# Patient Record
Sex: MALE | Employment: FULL TIME | URBAN - NONMETROPOLITAN AREA
[De-identification: names, ages, dates, MRNs, and addresses within clinical notes are randomized per-mention and may not be internally consistent; named-entity substitution may affect disease eponyms.]

---

## 2019-04-09 ENCOUNTER — HOSPITAL ENCOUNTER (OUTPATIENT)
Dept: GENERAL RADIOLOGY | Facility: HOSPITAL | Age: 39
Discharge: HOME | End: 2019-04-09
Attending: PHYSICIAN ASSISTANT

## 2019-04-09 LAB
25(OH)D3 SERPL-MCNC: 51.7 NG/ML (ref 30–100)
ALBUMIN SERPL-MCNC: 4.7 G/DL (ref 3.5–5)
ALBUMIN/GLOB SERPL: 1.6 {RATIO} (ref 1.4–2.6)
ALP SERPL-CCNC: 87 U/L (ref 53–128)
ALT SERPL-CCNC: 75 U/L (ref 10–40)
ANION GAP SERPL CALC-SCNC: 18 MMOL/L (ref 8–19)
AST SERPL-CCNC: 43 U/L (ref 15–50)
BASOPHILS # BLD AUTO: 0.08 10*3/UL (ref 0–0.2)
BASOPHILS NFR BLD AUTO: 1.1 % (ref 0–3)
BILIRUB SERPL-MCNC: 0.44 MG/DL (ref 0.2–1.3)
BUN SERPL-MCNC: 15 MG/DL (ref 5–25)
BUN/CREAT SERPL: 15 {RATIO} (ref 6–20)
CALCIUM SERPL-MCNC: 10 MG/DL (ref 8.7–10.4)
CHLORIDE SERPL-SCNC: 99 MMOL/L (ref 99–111)
CHOLEST SERPL-MCNC: 382 MG/DL (ref 107–200)
CHOLEST/HDLC SERPL: 10.3 {RATIO} (ref 3–6)
CONV ABS IMM GRAN: 0.08 10*3/UL (ref 0–0.2)
CONV CO2: 24 MMOL/L (ref 22–32)
CONV IMMATURE GRAN: 1.1 % (ref 0–1.8)
CONV TOTAL PROTEIN: 7.6 G/DL (ref 6.3–8.2)
CREAT UR-MCNC: 0.98 MG/DL (ref 0.7–1.2)
DEPRECATED RDW RBC AUTO: 45.5 FL (ref 35.1–43.9)
EOSINOPHIL # BLD AUTO: 0.21 10*3/UL (ref 0–0.7)
EOSINOPHIL # BLD AUTO: 2.8 % (ref 0–7)
ERYTHROCYTE [DISTWIDTH] IN BLOOD BY AUTOMATED COUNT: 14.3 % (ref 11.6–14.4)
GFR SERPLBLD BASED ON 1.73 SQ M-ARVRAT: >60 ML/MIN/{1.73_M2}
GLOBULIN UR ELPH-MCNC: 2.9 G/DL (ref 2–3.5)
GLUCOSE SERPL-MCNC: 102 MG/DL (ref 70–99)
HBA1C MFR BLD: 15.5 G/DL (ref 14–18)
HCT VFR BLD AUTO: 48.2 % (ref 42–52)
HDLC SERPL-MCNC: 37 MG/DL (ref 40–60)
LDLC SERPL CALC-MCNC: 272 MG/DL (ref 70–100)
LYMPHOCYTES # BLD AUTO: 1.96 10*3/UL (ref 1–5)
MCH RBC QN AUTO: 28.2 PG (ref 27–31)
MCHC RBC AUTO-ENTMCNC: 32.2 G/DL (ref 33–37)
MCV RBC AUTO: 87.8 FL (ref 80–96)
MONOCYTES # BLD AUTO: 0.57 10*3/UL (ref 0.2–1.2)
MONOCYTES NFR BLD AUTO: 7.6 % (ref 3–10)
NEUTROPHILS # BLD AUTO: 4.61 10*3/UL (ref 2–8)
NEUTROPHILS NFR BLD AUTO: 61.3 % (ref 30–85)
NRBC CBCN: 0 % (ref 0–0.7)
OSMOLALITY SERPL CALC.SUM OF ELEC: 283 MOSM/KG (ref 273–304)
PLATELET # BLD AUTO: 251 10*3/UL (ref 130–400)
PMV BLD AUTO: 11.5 FL (ref 9.4–12.4)
POTASSIUM SERPL-SCNC: 4.7 MMOL/L (ref 3.5–5.3)
RBC # BLD AUTO: 5.49 10*6/UL (ref 4.7–6.1)
SODIUM SERPL-SCNC: 136 MMOL/L (ref 135–147)
T4 FREE SERPL-MCNC: 1.2 NG/DL (ref 0.9–1.8)
TRIGL SERPL-MCNC: 647 MG/DL (ref 40–150)
TSH SERPL-ACNC: 2.26 M[IU]/L (ref 0.27–4.2)
VARIANT LYMPHS NFR BLD MANUAL: 26.1 % (ref 20–45)
WBC # BLD AUTO: 7.51 10*3/UL (ref 4.8–10.8)

## 2019-10-04 ENCOUNTER — HOSPITAL ENCOUNTER (OUTPATIENT)
Dept: GENERAL RADIOLOGY | Facility: HOSPITAL | Age: 39
Discharge: HOME | End: 2019-10-04
Attending: FAMILY MEDICINE

## 2019-10-04 ENCOUNTER — OFFICE VISIT CONVERTED (OUTPATIENT)
Dept: FAMILY MEDICINE CLINIC | Facility: CLINIC | Age: 39
End: 2019-10-04
Attending: FAMILY MEDICINE

## 2019-10-04 LAB
ALBUMIN SERPL-MCNC: 5.3 G/DL (ref 3.5–5)
ALBUMIN/GLOB SERPL: 1.8 {RATIO} (ref 1.4–2.6)
ALP SERPL-CCNC: 96 U/L (ref 53–128)
ALT SERPL-CCNC: 68 U/L (ref 10–40)
ANION GAP SERPL CALC-SCNC: 21 MMOL/L (ref 8–19)
AST SERPL-CCNC: 39 U/L (ref 15–50)
BILIRUB SERPL-MCNC: 0.45 MG/DL (ref 0.2–1.3)
BUN SERPL-MCNC: 14 MG/DL (ref 5–25)
BUN/CREAT SERPL: 14 {RATIO} (ref 6–20)
CALCIUM SERPL-MCNC: 10.7 MG/DL (ref 8.7–10.4)
CHLORIDE SERPL-SCNC: 100 MMOL/L (ref 99–111)
CONV CO2: 21 MMOL/L (ref 22–32)
CONV TOTAL PROTEIN: 8.2 G/DL (ref 6.3–8.2)
CREAT UR-MCNC: 0.98 MG/DL (ref 0.7–1.2)
GFR SERPLBLD BASED ON 1.73 SQ M-ARVRAT: >60 ML/MIN/{1.73_M2}
GLOBULIN UR ELPH-MCNC: 2.9 G/DL (ref 2–3.5)
GLUCOSE SERPL-MCNC: 106 MG/DL (ref 70–99)
OSMOLALITY SERPL CALC.SUM OF ELEC: 287 MOSM/KG (ref 273–304)
POTASSIUM SERPL-SCNC: 4.2 MMOL/L (ref 3.5–5.3)
SODIUM SERPL-SCNC: 138 MMOL/L (ref 135–147)

## 2020-01-09 ENCOUNTER — OFFICE VISIT CONVERTED (OUTPATIENT)
Dept: FAMILY MEDICINE CLINIC | Facility: CLINIC | Age: 40
End: 2020-01-09
Attending: FAMILY MEDICINE

## 2020-01-09 ENCOUNTER — CONVERSION ENCOUNTER (OUTPATIENT)
Dept: FAMILY MEDICINE CLINIC | Facility: CLINIC | Age: 40
End: 2020-01-09

## 2020-07-10 ENCOUNTER — OFFICE VISIT CONVERTED (OUTPATIENT)
Dept: FAMILY MEDICINE CLINIC | Facility: CLINIC | Age: 40
End: 2020-07-10
Attending: FAMILY MEDICINE

## 2020-07-17 ENCOUNTER — HOSPITAL ENCOUNTER (OUTPATIENT)
Dept: GENERAL RADIOLOGY | Facility: HOSPITAL | Age: 40
Discharge: HOME | End: 2020-07-17
Attending: FAMILY MEDICINE

## 2020-07-17 LAB
ALBUMIN SERPL-MCNC: 4.9 G/DL (ref 3.5–5)
ALBUMIN/GLOB SERPL: 1.9 {RATIO} (ref 1.4–2.6)
ALP SERPL-CCNC: 72 U/L (ref 53–128)
ALT SERPL-CCNC: 67 U/L (ref 10–40)
ANION GAP SERPL CALC-SCNC: 24 MMOL/L (ref 8–19)
AST SERPL-CCNC: 47 U/L (ref 15–50)
BASOPHILS # BLD AUTO: 0.11 10*3/UL (ref 0–0.2)
BASOPHILS NFR BLD AUTO: 1.2 % (ref 0–3)
BILIRUB SERPL-MCNC: 0.39 MG/DL (ref 0.2–1.3)
BUN SERPL-MCNC: 11 MG/DL (ref 5–25)
BUN/CREAT SERPL: 12 {RATIO} (ref 6–20)
CALCIUM SERPL-MCNC: 10.1 MG/DL (ref 8.7–10.4)
CHLORIDE SERPL-SCNC: 100 MMOL/L (ref 99–111)
CHOLEST SERPL-MCNC: 288 MG/DL (ref 107–200)
CHOLEST/HDLC SERPL: 6.5 {RATIO} (ref 3–6)
CONV ABS IMM GRAN: 0.15 10*3/UL (ref 0–0.2)
CONV CO2: 19 MMOL/L (ref 22–32)
CONV IMMATURE GRAN: 1.7 % (ref 0–1.8)
CONV TOTAL PROTEIN: 7.5 G/DL (ref 6.3–8.2)
CREAT UR-MCNC: 0.95 MG/DL (ref 0.7–1.2)
DEPRECATED RDW RBC AUTO: 45.4 FL (ref 35.1–43.9)
EOSINOPHIL # BLD AUTO: 0.32 10*3/UL (ref 0–0.7)
EOSINOPHIL # BLD AUTO: 3.5 % (ref 0–7)
ERYTHROCYTE [DISTWIDTH] IN BLOOD BY AUTOMATED COUNT: 14.1 % (ref 11.6–14.4)
GFR SERPLBLD BASED ON 1.73 SQ M-ARVRAT: >60 ML/MIN/{1.73_M2}
GLOBULIN UR ELPH-MCNC: 2.6 G/DL (ref 2–3.5)
GLUCOSE SERPL-MCNC: 96 MG/DL (ref 70–99)
HCT VFR BLD AUTO: 48.9 % (ref 42–52)
HDLC SERPL-MCNC: 44 MG/DL (ref 40–60)
HGB BLD-MCNC: 15.7 G/DL (ref 14–18)
LDLC SERPL CALC-MCNC: 151 MG/DL (ref 70–100)
LYMPHOCYTES # BLD AUTO: 2.43 10*3/UL (ref 1–5)
LYMPHOCYTES NFR BLD AUTO: 26.8 % (ref 20–45)
MCH RBC QN AUTO: 28.4 PG (ref 27–31)
MCHC RBC AUTO-ENTMCNC: 32.1 G/DL (ref 33–37)
MCV RBC AUTO: 88.6 FL (ref 80–96)
MONOCYTES # BLD AUTO: 0.61 10*3/UL (ref 0.2–1.2)
MONOCYTES NFR BLD AUTO: 6.7 % (ref 3–10)
NEUTROPHILS # BLD AUTO: 5.46 10*3/UL (ref 2–8)
NEUTROPHILS NFR BLD AUTO: 60.1 % (ref 30–85)
NRBC CBCN: 0 % (ref 0–0.7)
OSMOLALITY SERPL CALC.SUM OF ELEC: 285 MOSM/KG (ref 273–304)
PLATELET # BLD AUTO: 250 10*3/UL (ref 130–400)
PMV BLD AUTO: 10.8 FL (ref 9.4–12.4)
POTASSIUM SERPL-SCNC: 4.6 MMOL/L (ref 3.5–5.3)
PSA SERPL-MCNC: 0.43 NG/ML (ref 0–4)
RBC # BLD AUTO: 5.52 10*6/UL (ref 4.7–6.1)
SODIUM SERPL-SCNC: 138 MMOL/L (ref 135–147)
TRIGL SERPL-MCNC: 531 MG/DL (ref 40–150)
TSH SERPL-ACNC: 2.4 M[IU]/L (ref 0.27–4.2)
WBC # BLD AUTO: 9.08 10*3/UL (ref 4.8–10.8)

## 2020-09-02 ENCOUNTER — CONVERSION ENCOUNTER (OUTPATIENT)
Dept: FAMILY MEDICINE CLINIC | Facility: CLINIC | Age: 40
End: 2020-09-02

## 2020-09-02 ENCOUNTER — OFFICE VISIT CONVERTED (OUTPATIENT)
Dept: FAMILY MEDICINE CLINIC | Facility: CLINIC | Age: 40
End: 2020-09-02
Attending: FAMILY MEDICINE

## 2020-10-02 ENCOUNTER — OFFICE VISIT CONVERTED (OUTPATIENT)
Dept: FAMILY MEDICINE CLINIC | Facility: CLINIC | Age: 40
End: 2020-10-02
Attending: FAMILY MEDICINE

## 2021-05-13 NOTE — PROGRESS NOTES
Progress Note      Patient Name: Reji Montana   Patient ID: [id number]   Sex: Male   YOB: 1980    Primary Care Provider: Sanam Johnson DO   Referring Provider: Sanam Johnson DO    Visit Date: September 2, 2020    Provider: Sanam Johnson DO   Location: [facility]   Location Address: [address] [place] [address]  [address] [place] [address]  [id number]   Location Phone: [phone]          Chief Complaint  · follow up from blood work  · blood pressure running high      History Of Present Illness  Reji Montana is a 40 year old /White male who presents for evaluation and treatment of:      He is here today for follow-up for the management of his chronic medical conditions.  He has a past medical history significant for anxiety disorder, hypertension, GERD and hyperlipidemia.    Labs 7/17/2020: , HDL 44, triglyceride 531, PSA 0.43, creatinine 0.95, glucose greater than 60, potassium 4.6, ALT 67, hemoglobin 15.7, MCV 88.6, platelets 250, TSH 2.40.    Labs 10/4/19: Creatinine 0.98, GFR greater than 60, potassium 4.2, ALT 68, AST 39.    Labs/9/19: Hemoglobin 15.5, MCV 87.8, platelets 251.    4/9/2019: Triglycerides 647, , HDL 37, vitamin D 51.7, TSH 2.26, free T4 1.2.    He said his blood pressure at home when he checks it is around high 130s/90s. He has seen numbers at 150/95.    As long as he takes his 20 mg of omeprazole his reflux is well controlled.    He says the Lexapro still helps him with his anxiety.     He says that his b/l hand pain is much better with Magnesium daily.  He says it is worse at the end of the day.  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  He says at nighttime his hands ache and sometimes wake him up when he is trying to sleep. Has been taking Magnesium qday and using the Volatren about every other day and has helped wit the hand pain. He has started taking magnesium and his hand pain is better.     He has no other  complaints today. denies chest pain, shortness of breath, weakness, numbness, nausea, vomiting, diarrhea, dizziness or syncopal event.                Past Medical History  Disease Name Date Onset Notes   Anxiety --  --    Depression --  --    Hypertension --  --          Past Surgical History  Procedure Name Date Notes   EGD 2015 --    Hernia-Inguinal --  --    Tonsillectomy 2008 --          Medication List  Name Date Started Instructions   atorvastatin 40 mg oral tablet 09/02/2020 take 1 po qhs   escitalopram oxalate 10 mg oral tablet 08/31/2020 TAKE 1 TABLET(10 MG) BY MOUTH EVERY DAY   lisinopril 40 mg oral tablet 09/02/2020 take 1 qd   omeprazole 20 mg oral capsule,delayed release(DR/EC)  take 1 capsule (20 mg) by oral route once daily before a meal   Voltaren 1 % topical gel 07/10/2020 apply 2 grams to the affected area(s) by topical route 4 times per day for 30 days         Allergy List  Allergen Name Date Reaction Notes   NO KNOWN DRUG ALLERGIES --  --  --          Family Medical History  Disease Name Relative/Age Notes   Lung Neoplasm, Malignant Grandfather (maternal)/  Grandmother (maternal)/   --    Hypertension Grandfather (maternal)/  Grandmother (maternal)/   --    - No Family History of Colorectal Cancer  --          Social History  Finding Status Start/Stop Quantity Notes   Alcohol Current some day --/-- --  3-4 beers every other day   Tobacco Current every day 23/-- 1ppd  --          Immunizations  NameDate Admin Mfg Trade Name Lot Number Route Inj VIS Given VIS Publication   InfluenzaRefused 01/09/2020 NE Not Entered  NE NE     Comments: patient declined         Review of Systems  · Constitutional  o * See HPI  · HENT  o * See HPI  · Cardiovascular  o * See HPI  · Respiratory  o * See HPI  · Gastrointestinal  o * See HPI  · Musculoskeletal  o * See HPI  · Psychiatric  o * See HPI      Vitals  Date Time BP Position Site L\R Cuff Size HR RR TEMP (F) WT  HT  BMI kg/m2 BSA m2 O2 Sat HC       09/02/2020  04:01 /99 Sitting    72 - R 20 98.8 200lbs 3oz 6'   27.15 2.15 98 %          Physical Examination  · Constitutional  o Appearance  o : well-nourished, well developed, alert, no obvious deformities present, NAD  · Head and Face  o Head  o :   § Inspection  § : atraumatic, normocephalic  · Ears, Nose, Mouth and Throat  o Ears  o :   § External Ears  § : normal  o Nose  o :   § Intranasal Exam  § : nares patent  o Oral Cavity  o :   § Oral Mucosa  § : moist mucous membranes  · Respiratory  o Respiratory Effort  o : breathing comfortably, symmetric chest rise  o Auscultation of Lungs  o : clear to asculatation bilaterally, no wheezes, rales, or rhonchii  · Cardiovascular  o Heart  o :   § Auscultation of Heart  § : regular rate and rhythm, no murmurs, rubs, or gallops  o Peripheral Vascular System  o :   § Extremities  § : no edema  · Skin and Subcutaneous Tissue  o General Inspection  o : no rashes present, no lesions present, no areas of discoloration, skin turgor normal, texture normal  · Neurologic  o Mental Status Examination  o :   § Orientation  § : grossly oriented to person, place and time  o Cranial Nerves  o : cranial nerves intact bilaterally  o Gait and Station  o :   § Gait Screening  § : normal gait  · Psychiatric  o General  o : normal mood and affect              Assessment  · Anxiety disorder     300.00/F41.9  · Essential hypertension     401.9/I10  · GERD (gastroesophageal reflux disease)     530.81/K21.9  · Hyperlipidemia     272.4/E78.5  · Encounter for medication monitoring     V58.83/Z51.81      Plan  · Orders  o ACO-39: Current medications updated and reviewed () - - 09/02/2020  o ACO-14: Influenza immunization was not administered for reasons documented () - - 09/02/2020  · Medications  o atorvastatin 40 mg oral tablet   SIG: take 1 po qhs   DISP: (90) tablets with 1 refills  Adjusted on 09/02/2020     o lisinopril 40 mg oral tablet   SIG: take 1 qd   DISP: (90) tablets with 1  refills  Adjusted on 09/02/2020     o Medications have been Reconciled  o Transition of Care or Provider Policy  · Instructions  o Advised that cheeses and other sources of dairy fats, animal fats, fast food, and the extras (candy, pastries, pies, doughnuts and cookies) all contain LDL raising nutrients. Advised to increase fruits, vegetables, whole grains, and to monitor portion sizes.   o Patient was educated/instructed on their diagnosis, treatment and medications prior to discharge from the clinic today.  · Disposition  o Return Visit Request in/on 4 months +/- 2 days ([id number]).     1.  Hypertension: The patient's lisinopril was increased at today's visit from 20 to 40 mg daily.  He was encouraged to continue his blood pressure log to bring back to his next appointment.  2.  Hyperlipidemia: The patient's atorvastatin was increased from 10 mg to 40 mg daily.  We will repeat a lipid profile and 4 to 6 months.  3.  GERD: The patient's GERD is currently well controlled with his current dosage of omeprazole daily.  4.  Anxiety disorder: The patient's anxiety is currently well controlled with Lexapro 10 mg daily.  Follow-up in 4 months.             Electronically Signed by: Sanam Johnson DO -Author on September 3, 2020 09:46:41 PM

## 2021-05-13 NOTE — PROGRESS NOTES
Progress Note      Patient Name: Reji Montana   Patient ID: [id number]   Sex: Male   YOB: 1980    Primary Care Provider: Sanam Johnson DO   Referring Provider: Sanam Johnson DO    Visit Date: July 10, 2020    Provider: Sanam Johnson DO   Location: [facility]   Location Address: [address] [place] [address] [address]  [address] [place] [address]  [id number]   Location Phone: [phone]          Chief Complaint  · Pt states her today for follow up and having problems with his hands      History Of Present Illness  Reji Montana is a 40 year old /White male who presents for evaluation and treatment of:      He is here today for follow-up for the management of his chronic medical conditions.  He has a past medical history significant for anxiety disorder, hypertension, GERD and hyperlipidemia.    Labs 10/4/19: Creatinine 0.98, GFR greater than 60, potassium 4.2, ALT 68, AST 39.    Labs/9/19: Hemoglobin 15.5, MCV 87.8, platelets 251.    4/9/2019: Triglycerides 647, , HDL 37, vitamin D 51.7, TSH 2.26, free T4 1.2.    He said his blood pressure at home when he checks it is around 128/88.    He is tolerating his 10 mg atorvastatin without muscle cramps.    As long as he takes his 20 mg of omeprazole his reflux is well controlled.    He says the Lexapro still helps him with his anxiety.    He is having b/l hand pain that is getting worse.  He says it is worse at the end of the day.  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  He says at nighttime his hands ache and sometimes wake him up when he is trying to sleep.    He has no other complaints today denies chest pain, shortness of breath, weakness, numbness, nausea, vomiting, diarrhea, dizziness or syncopal event.       Past Medical History  Disease Name Date Onset Notes   Anxiety --  --    Depression --  --    Hypertension --  --          Past Surgical History  Procedure Name Date Notes   EGD 2015 --    Hernia-Inguinal --   --    Tonsillectomy 2008 --          Medication List  Name Date Started Instructions   atorvastatin 10 mg oral tablet 04/08/2020 TAKE 1 TABLET BY MOUTH AT BEDTIME   escitalopram oxalate 10 mg oral tablet 06/04/2020 TAKE 1 TABLET(10 MG) BY MOUTH EVERY DAY   lisinopril 20 mg oral tablet 04/08/2020 TAKE 1 TABLET BY MOUTH ONCE DAILY   omeprazole 20 mg oral capsule,delayed release(DR/EC)  take 1 capsule (20 mg) by oral route once daily before a meal         Allergy List  Allergen Name Date Reaction Notes   NO KNOWN DRUG ALLERGIES --  --  --          Family Medical History  Disease Name Relative/Age Notes   Lung Neoplasm, Malignant Grandfather (maternal)/  Grandmother (maternal)/   --    Hypertension Grandfather (maternal)/  Grandmother (maternal)/   --    - No Family History of Colorectal Cancer  --          Social History  Finding Status Start/Stop Quantity Notes   Alcohol Current some day --/-- --  3-4 beers every other day   Tobacco Current every day 23/-- 1ppd  --          Immunizations  NameDate Admin Mfg Trade Name Lot Number Route Inj VIS Given VIS Publication   InfluenzaRefused 01/09/2020 NE Not Entered  NE NE     Comments: patient declined         Review of Systems  · Constitutional  o * See HPI  · HENT  o * See HPI  · Cardiovascular  o * See HPI  · Respiratory  o * See HPI  · Gastrointestinal  o * See HPI  · Musculoskeletal  o * See HPI      Vitals  Date Time BP Position Site L\R Cuff Size HR RR TEMP (F) WT  HT  BMI kg/m2 BSA m2 O2 Sat HC       07/10/2020 03:56 /102 Sitting    84 - R 16 98.2 204lbs 6oz 6'   27.72 2.17 96 %    07/10/2020 03:56 /97 Sitting                     Physical Examination  · Constitutional  o Appearance  o : well-nourished, well developed, alert, NAD  · Head and Face  o Head  o :   § Inspection  § : atraumatic, normocephalic  · Eyes  o Eyes  o : extraocular movements intact, no scleral icterus, no conjunctival injection  · Ears, Nose, Mouth and Throat  o Ears  o :   § External  Ears  § : normal  o Nose  o :   § Intranasal Exam  § : nares patent  o Oral Cavity  o :   § Oral Mucosa  § : moist mucous membranes  · Respiratory  o Respiratory Effort  o : breathing comfortably, symmetric chest rise  o Auscultation of Lungs  o : clear to asculatation bilaterally, no wheezes, rales, or rhonchii  · Cardiovascular  o Heart  o :   § Auscultation of Heart  § : regular rate and rhythm, no murmurs, rubs, or gallops  o Peripheral Vascular System  o :   § Extremities  § : no edema  · Skin and Subcutaneous Tissue  o General Inspection  o : no rashes present, no lesions present, no areas of discoloration, skin turgor normal  · Neurologic  o Mental Status Examination  o :   § Orientation  § : grossly oriented to person, place and time  o Cranial Nerves  o : crainial nerves 2-12 grossly intact  o Gait and Station  o :   § Gait Screening  § : normal gait  · Psychiatric  o General  o : normal mood and affect          Assessment  · Anxiety disorder     300.00/F41.9  · Essential hypertension     401.9/I10  · GERD (gastroesophageal reflux disease)     530.81/K21.9  · Hyperlipidemia     272.4/E78.5  · Screening for prostate cancer     V76.44/Z12.5  · Hand pain     729.5/M79.643    Problems Reconciled  Plan  · Orders  o Male Physical Primary Care Panel (CMP, CBC, TSH, Lipid, PSA) [facility] ([id number], 75848, 53220, 90366, 12480, ) - 272.4/E78.5, V76.44/Z12.5, 401.9/I10 - 07/10/2020  o ACO-39: Current medications updated and reviewed () - - 07/10/2020  · Medications  o Voltaren 1 % topical gel   SIG: apply 2 grams to the affected area(s) by topical route 4 times per day for 30 days   DISP: (1) 100 gm tube with 2 refills  Prescribed on 07/10/2020     o Medications have been Reconciled  o Transition of Care or Provider Policy  · Instructions  o Advised that cheeses and other sources of dairy fats, animal fats, fast food, and the extras (candy, pastries, pies, doughnuts and cookies) all contain LDL raising nutrients.  Advised to increase fruits, vegetables, whole grains, and to monitor portion sizes.   o Patient was educated/instructed on their diagnosis, treatment and medications prior to discharge from the clinic today.  · Disposition  o Follow Up in 2 weeks     1.  Anxiety disorder: The patient's anxiety appears to be well controlled with 10 mg of Lexapro daily.  2.  Hypertension: The patient's blood pressure is elevated today's visit.  His home pressures appear to be closer to normal but are still high as well.  At his next clinic appointment if his blood pressure still elevated we will increase his antihypertensive medications.  3.  GERD: Patient's GERD is currently well controlled with his current dose of omeprazole.  4.  Hyperlipidemia: The patient will be continued on atorvastatin 10 mg daily.  He was given a lab order today for a lipid profile to be managed according findings.  5.  Hand pain: Patient was given a prescription today for Voltaren cream.  He is also told to use over-the-counter magnesium cases to muscle cramps.  Follow-up in 2 weeks.             Electronically Signed by: Sanam Johnson DO - on July 10, 2020 07:07:47 PM

## 2021-05-13 NOTE — PROGRESS NOTES
Progress Note      Patient Name: Reji Montana   Patient ID: [id number]   Sex: Male   YOB: 1980    Primary Care Provider: Sanam Johnson DO   Referring Provider: Sanam Johnson DO    Visit Date: October 2, 2020    Provider: Sanam Johnson DO   Location: [facility]   Location Address: [address]  [place], [place]  [id number]          Chief Complaint  · 1mo follow up       History Of Present Illness  Reji Montana is a 40 year old /White male who presents for evaluation and treatment of:      He is here today for follow-up for the management of his chronic medical conditions.  He has a past medical history significant for anxiety disorder, hypertension, GERD and hyperlipidemia.    Labs 7/17/2020: , HDL 44, triglyceride 531, PSA 0.43, creatinine 0.95, glucose greater than 60, potassium 4.6, ALT 67, hemoglobin 15.7, MCV 88.6, platelets 250, TSH 2.40.    Labs 10/4/19: Creatinine 0.98, GFR greater than 60, potassium 4.2, ALT 68, AST 39.    Labs/9/19: Hemoglobin 15.5, MCV 87.8, platelets 251.    4/9/2019: Triglycerides 647, , HDL 37, vitamin D 51.7, TSH 2.26, free T4 1.2.    He said his blood pressure at home when he checks it on average is 128/89. He says he feels much better now that his blood pressure is lower.     His GERD is well controlled with 20 mg of omeprazole daily.    He says the Lexapro still helps him with his anxiety.     His hand pain at night is still much better with magnesium and voltarian cream.     He has no other complaints today. denies chest pain, shortness of breath, weakness, numbness, nausea, vomiting, diarrhea, dizziness or syncopal event.              Past Medical History  Disease Name Date Onset Notes   Anxiety --  --    Depression --  --    Hypertension --  --          Past Surgical History  Procedure Name Date Notes   EGD 2015 --    Hernia-Inguinal --  --    Tonsillectomy 2008 --          Medication List  Name Date Started  Instructions   atorvastatin 40 mg oral tablet 09/02/2020 take 1 po qhs   escitalopram oxalate 10 mg oral tablet 08/31/2020 TAKE 1 TABLET(10 MG) BY MOUTH EVERY DAY   lisinopril 40 mg oral tablet 09/02/2020 take 1 qd   omeprazole 20 mg oral capsule,delayed release(DR/EC)  take 1 capsule (20 mg) by oral route once daily before a meal   Voltaren 1 % topical gel 07/10/2020 apply 2 grams to the affected area(s) by topical route 4 times per day for 30 days         Allergy List  Allergen Name Date Reaction Notes   NO KNOWN DRUG ALLERGIES --  --  --          Family Medical History  Disease Name Relative/Age Notes   Lung Neoplasm, Malignant Grandfather (maternal)/  Grandmother (maternal)/   --    Hypertension Grandfather (maternal)/  Grandmother (maternal)/   --    - No Family History of Colorectal Cancer  --          Social History  Finding Status Start/Stop Quantity Notes   Alcohol Current some day --/-- --  3-4 beers every other day   Tobacco Current every day 23/-- 1ppd  --          Immunizations  NameDate Admin Mfg Trade Name Lot Number Route Inj VIS Given VIS Publication   InfluenzaRefused 01/09/2020 NE Not Entered  NE NE     Comments: patient declined         Review of Systems  · Constitutional  o * See HPI  · HENT  o * See HPI  · Cardiovascular  o * See HPI  · Respiratory  o * See HPI  · Gastrointestinal  o * See HPI  · Integument  o * See HPI  · Neurologic  o * See HPI  · Musculoskeletal  o * See HPI  · Psychiatric  o * See HPI      Vitals  Date Time BP Position Site L\R Cuff Size HR RR TEMP (F) WT  HT  BMI kg/m2 BSA m2 O2 Sat FR L/min FiO2 HC       10/02/2020 03:42 /99 Sitting    70 - R 18 98.3 202lbs 8oz 6'   27.46 2.16 96 %  21%          Physical Examination  · Constitutional  o Appearance  o : no acute distress, well-nourished, no obvious deformities  · Head and Face  o Head  o :   § Inspection  § : atraumatic, normocephalic  · Ears, Nose, Mouth and Throat  o Ears  o :   § External Ears  § :  normal  o Nose  o :   § Intranasal Exam  § : nares patent  o Oral Cavity  o :   § Oral Mucosa  § : moist mucous membranes  · Respiratory  o Respiratory Effort  o : breathing comfortably, symmetric chest rise  o Auscultation of Lungs  o : clear to asculatation bilaterally, no wheezes, rales, or rhonchii  · Cardiovascular  o Heart  o :   § Auscultation of Heart  § : regular rate and rhythm, no murmurs, rubs, or gallops  o Peripheral Vascular System  o :   § Extremities  § : no edema  · Neurologic  o Mental Status Examination  o :   § Orientation  § : grossly oriented to person, place and time  o Cranial Nerves  o : cranial nerves intact bilaterally  o Gait and Station  o :   § Gait Screening  § : normal gait  · Psychiatric  o General  o : normal mood and affect              Assessment  · Essential hypertension     401.9/I10  His BP is much improved, continue Lisinopril 40 mg qday  · GERD (gastroesophageal reflux disease)     530.81/K21.9  Well controlled with omeprazole  · Hyperlipidemia     272.4/E78.5  He is tolerating the atorvastatin 40mg well. He is also taking fish oil. He is gradually making better food choices. He does not formally exercise but he is active with is work.  · Encounter for medication monitoring     V58.83/Z51.81  He was given a lab order for a CMP to make sure his liver enzymes are still normal with the increase in atorvastatin.       Plan  · Orders  o CMP [facility] (80615) - 272.4/E78.5, V58.83/Z51.81 - 10/02/2020  o ACO-39: Current medications updated and reviewed (1159F, ) - - 10/02/2020  o ACO-14: Influenza immunization was not administered for reasons documented [facility] () - - 10/02/2020  o Magnesium, serum (04447) - - 10/02/2020  · Medications  o Medications have been Reconciled  o Transition of Care or Provider Policy  · Instructions  o Advised that cheeses and other sources of dairy fats, animal fats, fast food, and the extras (candy, pastries, pies, doughnuts and cookies) all contain  LDL raising nutrients. Advised to increase fruits, vegetables, whole grains, and to monitor portion sizes.   o Patient was educated/instructed on their diagnosis, treatment and medications prior to discharge from the clinic today.  · Disposition  o Follow Up in 3 months            Electronically Signed by: Sanam Johnson DO -Author on October 6, 2020 09:25:19 PM

## 2021-05-14 VITALS
SYSTOLIC BLOOD PRESSURE: 148 MMHG | OXYGEN SATURATION: 98 % | HEART RATE: 72 BPM | BODY MASS INDEX: 27.11 KG/M2 | TEMPERATURE: 98.8 F | RESPIRATION RATE: 20 BRPM | DIASTOLIC BLOOD PRESSURE: 99 MMHG | WEIGHT: 200.19 LBS | HEIGHT: 72 IN

## 2021-05-14 VITALS
BODY MASS INDEX: 27.43 KG/M2 | TEMPERATURE: 98.3 F | HEIGHT: 72 IN | SYSTOLIC BLOOD PRESSURE: 132 MMHG | WEIGHT: 202.5 LBS | OXYGEN SATURATION: 96 % | RESPIRATION RATE: 18 BRPM | HEART RATE: 70 BPM | DIASTOLIC BLOOD PRESSURE: 99 MMHG

## 2021-05-15 VITALS
HEART RATE: 90 BPM | SYSTOLIC BLOOD PRESSURE: 141 MMHG | DIASTOLIC BLOOD PRESSURE: 68 MMHG | HEIGHT: 72 IN | OXYGEN SATURATION: 99 % | TEMPERATURE: 97.8 F | BODY MASS INDEX: 27.69 KG/M2 | WEIGHT: 204.44 LBS | RESPIRATION RATE: 20 BRPM

## 2021-05-15 VITALS
SYSTOLIC BLOOD PRESSURE: 134 MMHG | HEART RATE: 94 BPM | WEIGHT: 202.25 LBS | HEIGHT: 72 IN | OXYGEN SATURATION: 99 % | TEMPERATURE: 98.2 F | DIASTOLIC BLOOD PRESSURE: 87 MMHG | RESPIRATION RATE: 18 BRPM | BODY MASS INDEX: 27.39 KG/M2

## 2021-05-15 VITALS
DIASTOLIC BLOOD PRESSURE: 102 MMHG | OXYGEN SATURATION: 96 % | HEART RATE: 84 BPM | BODY MASS INDEX: 27.68 KG/M2 | WEIGHT: 204.37 LBS | HEIGHT: 72 IN | TEMPERATURE: 98.2 F | SYSTOLIC BLOOD PRESSURE: 141 MMHG | RESPIRATION RATE: 16 BRPM

## 2021-09-07 RX ORDER — ESCITALOPRAM OXALATE 10 MG/1
TABLET ORAL
Qty: 90 TABLET | Refills: 1 | Status: SHIPPED | OUTPATIENT
Start: 2021-09-07 | End: 2022-03-18 | Stop reason: SDUPTHER

## 2021-09-07 RX ORDER — LISINOPRIL 40 MG/1
TABLET ORAL
Qty: 90 TABLET | Refills: 1 | Status: SHIPPED | OUTPATIENT
Start: 2021-09-07 | End: 2022-03-18 | Stop reason: SDUPTHER

## 2022-03-17 RX ORDER — LISINOPRIL 40 MG/1
TABLET ORAL
Qty: 90 TABLET | Refills: 1 | OUTPATIENT
Start: 2022-03-17

## 2022-03-17 RX ORDER — ESCITALOPRAM OXALATE 10 MG/1
TABLET ORAL
Qty: 90 TABLET | Refills: 1 | OUTPATIENT
Start: 2022-03-17

## 2022-03-18 ENCOUNTER — OFFICE VISIT (OUTPATIENT)
Dept: FAMILY MEDICINE CLINIC | Facility: CLINIC | Age: 42
End: 2022-03-18

## 2022-03-18 VITALS
OXYGEN SATURATION: 99 % | SYSTOLIC BLOOD PRESSURE: 142 MMHG | TEMPERATURE: 97.7 F | WEIGHT: 197.2 LBS | DIASTOLIC BLOOD PRESSURE: 83 MMHG | BODY MASS INDEX: 26.71 KG/M2 | RESPIRATION RATE: 20 BRPM | HEART RATE: 80 BPM | HEIGHT: 72 IN

## 2022-03-18 DIAGNOSIS — Z11.59 NEED FOR HEPATITIS C SCREENING TEST: ICD-10-CM

## 2022-03-18 DIAGNOSIS — I10 PRIMARY HYPERTENSION: ICD-10-CM

## 2022-03-18 DIAGNOSIS — F41.1 GAD (GENERALIZED ANXIETY DISORDER): Chronic | ICD-10-CM

## 2022-03-18 DIAGNOSIS — Z00.00 ANNUAL PHYSICAL EXAM: Primary | ICD-10-CM

## 2022-03-18 DIAGNOSIS — E78.2 MIXED HYPERLIPIDEMIA: Chronic | ICD-10-CM

## 2022-03-18 PROCEDURE — 99395 PREV VISIT EST AGE 18-39: CPT | Performed by: FAMILY MEDICINE

## 2022-03-18 RX ORDER — ESCITALOPRAM OXALATE 10 MG/1
10 TABLET ORAL DAILY
Qty: 90 TABLET | Refills: 3 | Status: SHIPPED | OUTPATIENT
Start: 2022-03-18 | End: 2023-03-13

## 2022-03-18 RX ORDER — LISINOPRIL 40 MG/1
40 TABLET ORAL DAILY
Qty: 90 TABLET | Refills: 3 | Status: SHIPPED | OUTPATIENT
Start: 2022-03-18 | End: 2023-03-13
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
## 2022-03-18 NOTE — PROGRESS NOTES
"Chief Complaint  Med Refill and Annual Exam    Subjective          Reji West presents to [facility]  He is here today for management of his chronic medical conditions. He is  and has one step daughter.  He has a past medical history significant for anxiety, depression, hyperlipidemia and hypertension.  He has had hernia repair in tonsillectomy.  He has a family history of diabetes.    The patient has no other complaints today and denies chest pain, shortness of breath, weakness, numbness, nausea, vomiting, diarrhea, dizziness or syncopal event.        Objective   Vital Signs:   /83 (BP Location: Left arm, Patient Position: Sitting)   Pulse 80   Temp 97.7 °F (36.5 °C)   Resp 20   Ht 182.9 cm (72\")   Wt 89.4 kg (197 lb 3.2 oz)   SpO2 99%   BMI 26.75 kg/m²     Physical Exam  Vitals reviewed.   Constitutional:       Appearance: Normal appearance. He is well-developed.   HENT:      Head: Normocephalic and atraumatic.      Right Ear: External ear normal.      Left Ear: External ear normal.      Mouth/Throat:      Pharynx: No oropharyngeal exudate.   Eyes:      Conjunctiva/sclera: Conjunctivae normal.      Pupils: Pupils are equal, round, and reactive to light.   Neck:      Vascular: No carotid bruit.   Cardiovascular:      Rate and Rhythm: Normal rate and regular rhythm.      Heart sounds: No murmur heard.    No friction rub. No gallop.   Pulmonary:      Effort: Pulmonary effort is normal.      Breath sounds: Normal breath sounds. No wheezing or rhonchi.   Abdominal:      General: There is no distension.   Skin:     General: Skin is warm and dry.   Neurological:      Mental Status: He is alert and oriented to person, place, and time.      Cranial Nerves: No cranial nerve deficit.      Motor: No weakness.   Psychiatric:         Mood and Affect: Mood and affect normal.         Behavior: Behavior normal.         Thought Content: Thought content normal.        "  Judgment: Judgment normal.        Result Review :                               Assessment and Plan    Diagnoses and all orders for this visit:    1. Annual physical exam (Primary)  Assessment & Plan:  The patient was educated about the fact that the most likely Reason for him to get injured I would be from accident.  He was encouraged to wear his seatbelt never text and drive.  He was encouraged to always wear helmet when on a motorcycle or 4 carranza.  He was given orders today for screening labs to be managed to findings.  He is encouraged lose weight.    Orders:  -     Comprehensive Metabolic Panel; Future  -     CBC & Differential; Future  -     TSH; Future    2. Primary hypertension  Assessment & Plan:  Hypertension is improving with treatment.  Continue current treatment regimen.  Dietary sodium restriction.  Weight loss.  Blood pressure will be reassessed at the next regular appointment.      3. MALI (generalized anxiety disorder)  Assessment & Plan:  Psychological condition is improving with treatment.  Continue current treatment regimen.  Psychological condition  will be reassessed at the next regular appointment.      4. Mixed hyperlipidemia  Assessment & Plan:  Lipid abnormalities are improving with lifestyle modifications.  Nutritional counseling was provided.  Lipids will be reassessed in 1 year.    Orders:  -     Lipid Panel; Future    5. Need for hepatitis C screening test  -     Hepatitis C Antibody; Future    Other orders  -     lisinopril (PRINIVIL,ZESTRIL) 40 MG tablet; Take 1 tablet by mouth Daily.  Dispense: 90 tablet; Refill: 3  -     escitalopram (LEXAPRO) 10 MG tablet; Take 1 tablet by mouth Daily.  Dispense: 90 tablet; Refill: 3      Follow Up   Return in about 1 year (around 3/18/2023).  Patient was given instructions and counseling regarding his condition or for health maintenance advice. Please see specific information pulled into the AVS if appropriate.

## 2023-03-13 RX ORDER — ESCITALOPRAM OXALATE 10 MG/1
10 TABLET ORAL DAILY
Qty: 90 TABLET | Refills: 3 | Status: SHIPPED | OUTPATIENT
Start: 2023-03-13

## 2023-03-13 RX ORDER — LISINOPRIL 40 MG/1
40 TABLET ORAL DAILY
Qty: 90 TABLET | Refills: 0 | Status: SHIPPED | OUTPATIENT
Start: 2023-03-13

## 2023-03-13 NOTE — TELEPHONE ENCOUNTER
Patient has not been seen since 3/18/22. Will send in a refill for this medicine with no extra refills. Please have him make a follow up appointment in a couple of months.

## 2023-05-01 ENCOUNTER — TELEPHONE (OUTPATIENT)
Dept: FAMILY MEDICINE CLINIC | Facility: CLINIC | Age: 43
End: 2023-05-01
Payer: COMMERCIAL

## 2023-05-01 NOTE — TELEPHONE ENCOUNTER
Caller: Reji West    Relationship: Self    Best call back number: [phone]    What orders are you requesting (i.e. lab or imaging): LABS    In what timeframe would the patient need to come in: TOMORROW MORNING    Where will you receive your lab/imaging services: [facility]    Additional notes: PATIENT WOULD LIKE TO HAVE LABS DONE BEFORE HIS APPOINTMENT TOMORROW AND WOULD LIKE TO COME IN AS EARLY AS POSSIBLE BECAUSE HE STATED THAT HE NEEDS TO FAST. PLEASE CALL PATIENT AS SOON AS ORDERS ARE ACTIVE.

## 2023-05-02 ENCOUNTER — HOSPITAL ENCOUNTER (OUTPATIENT)
Dept: GENERAL RADIOLOGY | Facility: HOSPITAL | Age: 43
Discharge: HOME | End: 2023-05-02
Payer: COMMERCIAL

## 2023-05-02 ENCOUNTER — LAB (OUTPATIENT)
Dept: LAB | Facility: HOSPITAL | Age: 43
End: 2023-05-02
Payer: COMMERCIAL

## 2023-05-02 ENCOUNTER — HOSPITAL ENCOUNTER (OUTPATIENT)
Dept: MRI IMAGING | Facility: HOSPITAL | Age: 43
Discharge: HOME | End: 2023-05-02
Payer: COMMERCIAL

## 2023-05-02 ENCOUNTER — OFFICE VISIT (OUTPATIENT)
Dept: FAMILY MEDICINE CLINIC | Facility: CLINIC | Age: 43
End: 2023-05-02
Payer: COMMERCIAL

## 2023-05-02 VITALS
DIASTOLIC BLOOD PRESSURE: 88 MMHG | OXYGEN SATURATION: 97 % | WEIGHT: 194.7 LBS | SYSTOLIC BLOOD PRESSURE: 133 MMHG | HEIGHT: 72 IN | BODY MASS INDEX: 26.37 KG/M2 | TEMPERATURE: 97.8 F | HEART RATE: 84 BPM

## 2023-05-02 DIAGNOSIS — R56.9 SEIZURE-LIKE ACTIVITY: Primary | ICD-10-CM

## 2023-05-02 DIAGNOSIS — E78.2 MIXED HYPERLIPIDEMIA: Chronic | ICD-10-CM

## 2023-05-02 DIAGNOSIS — M25.572 ACUTE LEFT ANKLE PAIN: ICD-10-CM

## 2023-05-02 DIAGNOSIS — F41.1 GAD (GENERALIZED ANXIETY DISORDER): Chronic | ICD-10-CM

## 2023-05-02 DIAGNOSIS — Z00.00 ANNUAL PHYSICAL EXAM: ICD-10-CM

## 2023-05-02 DIAGNOSIS — M10.9 GOUT, UNSPECIFIED CAUSE, UNSPECIFIED CHRONICITY, UNSPECIFIED SITE: ICD-10-CM

## 2023-05-02 DIAGNOSIS — I10 PRIMARY HYPERTENSION: Chronic | ICD-10-CM

## 2023-05-02 DIAGNOSIS — M79.672 BILATERAL FOOT PAIN: ICD-10-CM

## 2023-05-02 DIAGNOSIS — M79.671 BILATERAL FOOT PAIN: ICD-10-CM

## 2023-05-02 DIAGNOSIS — R56.9 SEIZURE-LIKE ACTIVITY: ICD-10-CM

## 2023-05-02 LAB
BASOPHILS # BLD AUTO: 0.13 10*3/MM3 (ref 0–0.2)
BASOPHILS NFR BLD AUTO: 1.3 % (ref 0–1.5)
CHOLEST SERPL-MCNC: 359 MG/DL (ref 0–200)
DEPRECATED RDW RBC AUTO: 38.8 FL (ref 37–54)
EOSINOPHIL # BLD AUTO: 0.41 10*3/MM3 (ref 0–0.4)
EOSINOPHIL NFR BLD AUTO: 4 % (ref 0.3–6.2)
ERYTHROCYTE [DISTWIDTH] IN BLOOD BY AUTOMATED COUNT: 12.9 % (ref 12.3–15.4)
HCT VFR BLD AUTO: 47.7 % (ref 37.5–51)
HDLC SERPL-MCNC: 36 MG/DL (ref 40–60)
HGB BLD-MCNC: 16.5 G/DL (ref 13–17.7)
IMM GRANULOCYTES # BLD AUTO: 0.1 10*3/MM3 (ref 0–0.05)
IMM GRANULOCYTES NFR BLD AUTO: 1 % (ref 0–0.5)
LDLC SERPL CALC-MCNC: 250 MG/DL (ref 0–100)
LDLC/HDLC SERPL: 7.13 {RATIO}
LYMPHOCYTES # BLD AUTO: 2.04 10*3/MM3 (ref 0.7–3.1)
LYMPHOCYTES NFR BLD AUTO: 19.9 % (ref 19.6–45.3)
MCH RBC QN AUTO: 28.8 PG (ref 26.6–33)
MCHC RBC AUTO-ENTMCNC: 34.6 G/DL (ref 31.5–35.7)
MCV RBC AUTO: 83.4 FL (ref 79–97)
MONOCYTES # BLD AUTO: 0.83 10*3/MM3 (ref 0.1–0.9)
MONOCYTES NFR BLD AUTO: 8.1 % (ref 5–12)
NEUTROPHILS NFR BLD AUTO: 6.76 10*3/MM3 (ref 1.7–7)
NEUTROPHILS NFR BLD AUTO: 65.7 % (ref 42.7–76)
NRBC BLD AUTO-RTO: 0 /100 WBC (ref 0–0.2)
PLATELET # BLD AUTO: 287 10*3/MM3 (ref 140–450)
PMV BLD AUTO: 10.3 FL (ref 6–12)
RBC # BLD AUTO: 5.72 10*6/MM3 (ref 4.14–5.8)
T4 FREE SERPL-MCNC: 1.23 NG/DL (ref 0.93–1.7)
TRIGL SERPL-MCNC: 332 MG/DL (ref 0–150)
TSH SERPL DL<=0.05 MIU/L-ACNC: 2.7 UIU/ML (ref 0.27–4.2)
URATE SERPL-MCNC: 4 MG/DL (ref 3.4–7)
VLDLC SERPL-MCNC: 73 MG/DL (ref 5–40)
WBC NRBC COR # BLD: 10.27 10*3/MM3 (ref 3.4–10.8)

## 2023-05-02 PROCEDURE — 36415 COLL VENOUS BLD VENIPUNCTURE: CPT

## 2023-05-02 PROCEDURE — 73630 X-RAY EXAM OF FOOT: CPT

## 2023-05-02 PROCEDURE — 80053 COMPREHEN METABOLIC PANEL: CPT

## 2023-05-02 PROCEDURE — 70551 MRI BRAIN STEM W/O DYE: CPT

## 2023-05-02 PROCEDURE — 84439 ASSAY OF FREE THYROXINE: CPT

## 2023-05-02 PROCEDURE — 80061 LIPID PANEL: CPT

## 2023-05-02 PROCEDURE — 85025 COMPLETE CBC W/AUTO DIFF WBC: CPT

## 2023-05-02 PROCEDURE — 73610 X-RAY EXAM OF ANKLE: CPT

## 2023-05-02 PROCEDURE — 84443 ASSAY THYROID STIM HORMONE: CPT

## 2023-05-02 PROCEDURE — 84550 ASSAY OF BLOOD/URIC ACID: CPT

## 2023-05-02 RX ORDER — NAPROXEN 500 MG/1
500 TABLET ORAL 2 TIMES DAILY WITH MEALS
Qty: 60 TABLET | Refills: 0 | Status: SHIPPED | OUTPATIENT
Start: 2023-05-02

## 2023-05-02 RX ORDER — HYDROCODONE BITARTRATE AND ACETAMINOPHEN 5; 325 MG/1; MG/1
1 TABLET ORAL EVERY 6 HOURS PRN
Qty: 12 TABLET | Refills: 0 | Status: SHIPPED | OUTPATIENT
Start: 2023-05-02

## 2023-05-02 NOTE — ASSESSMENT & PLAN NOTE
The patient's symptoms are suspicious for seizure.  He was given order today for an MRI of the brain, EEG and neurology consult.  He was told if another one of the spells occur to call 911.

## 2023-05-02 NOTE — PROGRESS NOTES
"Chief Complaint  Loss of Consciousness (Last Saturday, pts wife states he passed out twice and was concerned he was having a seizure.), Fall (Pt states he fell 3 weeks ago and last Saturday.), and foot pain    Subjective        Reji West presents to [facility]  History of Present Illness  He is here today for management of his chronic medical conditions. He is  and has one step daughter.  He has a past medical history significant for anxiety, depression, hyperlipidemia and hypertension.  He has had hernia repair in tonsillectomy.  He has a family history of diabetes.     Three nights ago he had seizure like activity after a meal. He had two bouts. He did vomit. He is fine now.  He denies any more events since that time.    He is complaining of left ankle pain and thinks he hurt it when he fell during this event.  He is also having right foot pain and thinks it is gout flare.    The patient has no other complaints today and denies chest pain, shortness of breath, weakness, numbness, nausea, vomiting, diarrhea, dizziness or syncopal event.  Lower Extremity Issue  The symptoms are aggravated by movement.       Objective   Vital Signs:  /88   Pulse 84   Temp 97.8 °F (36.6 °C) (Temporal)   Ht 182.9 cm (72\")   Wt 88.3 kg (194 lb 11.2 oz)   SpO2 97%   BMI 26.41 kg/m²   Estimated body mass index is 26.41 kg/m² as calculated from the following:    Height as of this encounter: 182.9 cm (72\").    Weight as of this encounter: 88.3 kg (194 lb 11.2 oz).             Physical Exam  Vitals reviewed.   Constitutional:       Appearance: Normal appearance. He is well-developed.   HENT:      Head: Normocephalic and atraumatic.      Right Ear: External ear normal.      Left Ear: External ear normal.      Mouth/Throat:      Pharynx: No oropharyngeal exudate.   Eyes:      Conjunctiva/sclera: Conjunctivae normal.      Pupils: Pupils are equal, round, and reactive to light. "   Neck:      Vascular: No carotid bruit.   Cardiovascular:      Rate and Rhythm: Normal rate and regular rhythm.      Heart sounds: No murmur heard.    No friction rub. No gallop.   Pulmonary:      Effort: Pulmonary effort is normal.      Breath sounds: Normal breath sounds. No wheezing or rhonchi.   Abdominal:      General: There is no distension.   Musculoskeletal:        Feet:    Skin:     General: Skin is warm and dry.   Neurological:      Mental Status: He is alert and oriented to person, place, and time.      Cranial Nerves: No cranial nerve deficit.      Motor: No weakness.   Psychiatric:         Mood and Affect: Mood and affect normal.         Behavior: Behavior normal.         Thought Content: Thought content normal.         Judgment: Judgment normal.        Result Review :                                 Assessment and Plan   Diagnoses and all orders for this visit:    1. Seizure-like activity (Primary)  Assessment & Plan:  The patient's symptoms are suspicious for seizure.  He was given order today for an MRI of the brain, EEG and neurology consult.  He was told if another one of the spells occur to call 911.    Orders:  -     EEG; Future  -     MRI Brain Without Contrast; Future  -     Ambulatory Referral to Neurology    2. Acute left ankle pain  -     XR Ankle 3+ View Left; Future  -     HYDROcodone-acetaminophen (Norco) 5-325 MG per tablet; Take 1 tablet by mouth Every 6 (Six) Hours As Needed for Moderate Pain.  Dispense: 12 tablet; Refill: 0  -     naproxen (Naprosyn) 500 MG tablet; Take 1 tablet by mouth 2 (Two) Times a Day With Meals.  Dispense: 60 tablet; Refill: 0    3. Annual physical exam  -     TSH; Future  -     CBC & Differential; Future  -     Comprehensive metabolic panel; Future  -     TSH+Free T4; Future    4. Mixed hyperlipidemia  -     Lipid panel; Future    5. Bilateral foot pain  -     XR Foot 3+ View Left; Future  -     XR Foot 3+ View Right; Future    6. Gout, unspecified cause,  unspecified chronicity, unspecified site  -     Uric acid; Future    7. Primary hypertension  Assessment & Plan:  Hypertension is improving with treatment.  Continue current treatment regimen.  Dietary sodium restriction.  Weight loss.  Blood pressure will be reassessed at the next regular appointment.      8. MALI (generalized anxiety disorder)  Assessment & Plan:  Psychological condition is improving with treatment.  Continue current treatment regimen.  Regular aerobic exercise.  Psychological condition  will be reassessed at the next regular appointment.             Follow Up   No follow-ups on file.  Patient was given instructions and counseling regarding his condition or for health maintenance advice. Please see specific information pulled into the AVS if appropriate.       Answers for HPI/ROS submitted by the patient on 5/2/2023  What is the primary reason for your visit?: Lower Extremity Injury  Incident occurred: 2 days ago  Incident location: in the street  Injury mechanism: a fall  Pain location: left foot  Pain quality: stabbing  Pain - numeric: 10/10  Pain course: worsening  tingling: Yes  inability to bear weight: Yes  loss of motion: Yes  loss of sensation: No  muscle weakness: Yes  Foreign body present: no foreign bodies

## 2023-05-03 LAB
ALBUMIN SERPL-MCNC: 5 G/DL (ref 3.5–5.2)
ALBUMIN/GLOB SERPL: 1.7 G/DL
ALP SERPL-CCNC: 92 U/L (ref 39–117)
ALT SERPL W P-5'-P-CCNC: 44 U/L (ref 1–41)
ANION GAP SERPL CALCULATED.3IONS-SCNC: 12 MMOL/L (ref 5–15)
AST SERPL-CCNC: 32 U/L (ref 1–40)
BILIRUB SERPL-MCNC: 0.4 MG/DL (ref 0–1.2)
BUN SERPL-MCNC: 10 MG/DL (ref 6–20)
BUN/CREAT SERPL: 13 (ref 7–25)
CALCIUM SPEC-SCNC: 10.3 MG/DL (ref 8.6–10.5)
CHLORIDE SERPL-SCNC: 97 MMOL/L (ref 98–107)
CO2 SERPL-SCNC: 27 MMOL/L (ref 22–29)
CREAT SERPL-MCNC: 0.77 MG/DL (ref 0.76–1.27)
EGFRCR SERPLBLD CKD-EPI 2021: 116.1 ML/MIN/1.73
GLOBULIN UR ELPH-MCNC: 2.9 GM/DL
GLUCOSE SERPL-MCNC: 83 MG/DL (ref 65–99)
POTASSIUM SERPL-SCNC: 4.4 MMOL/L (ref 3.5–5.2)
PROT SERPL-MCNC: 7.9 G/DL (ref 6–8.5)
SODIUM SERPL-SCNC: 136 MMOL/L (ref 136–145)

## 2023-05-05 ENCOUNTER — TELEPHONE (OUTPATIENT)
Dept: FAMILY MEDICINE CLINIC | Facility: CLINIC | Age: 43
End: 2023-05-05
Payer: COMMERCIAL

## 2023-05-05 DIAGNOSIS — E78.2 MIXED HYPERLIPIDEMIA: Primary | Chronic | ICD-10-CM

## 2023-05-05 NOTE — TELEPHONE ENCOUNTER
Patient returned our call for lab results, will start cholesterol medication.  Please send to [facility] in [place].

## 2023-05-07 RX ORDER — ROSUVASTATIN CALCIUM 20 MG/1
20 TABLET, COATED ORAL DAILY
Qty: 90 TABLET | Refills: 1 | Status: SHIPPED | OUTPATIENT
Start: 2023-05-07

## 2023-05-08 ENCOUNTER — OFFICE VISIT (OUTPATIENT)
Dept: NEUROLOGY | Facility: CLINIC | Age: 43
End: 2023-05-08
Payer: COMMERCIAL

## 2023-05-08 ENCOUNTER — PATIENT ROUNDING (BHMG ONLY) (OUTPATIENT)
Dept: NEUROLOGY | Facility: CLINIC | Age: 43
End: 2023-05-08
Payer: COMMERCIAL

## 2023-05-08 VITALS
DIASTOLIC BLOOD PRESSURE: 80 MMHG | HEART RATE: 89 BPM | HEIGHT: 72 IN | BODY MASS INDEX: 26.82 KG/M2 | WEIGHT: 198 LBS | SYSTOLIC BLOOD PRESSURE: 121 MMHG

## 2023-05-08 DIAGNOSIS — R56.9 NEW ONSET SEIZURE: ICD-10-CM

## 2023-05-08 DIAGNOSIS — R55 CONVULSIVE SYNCOPE: Primary | ICD-10-CM

## 2023-05-08 PROCEDURE — 99203 OFFICE O/P NEW LOW 30 MIN: CPT | Performed by: PSYCHIATRY & NEUROLOGY

## 2023-05-08 NOTE — PROGRESS NOTES
Chief Complaint  Neurologic Problem (Seizure like activity. )    Subjective          Reji West is a 42 y.o. male who presents to [facility]  History of Present Illness  42-year-old man evaluated for passing out.  Last week he was in [facility] and he got out of the cab and was witnessed to have a spell in which she passed out.  He states that he just had finished dinner and he went out of the cab to try to smoke a cigarette and when he was holding onto a pole he felt like his face got cold he got dizzy and he slowly slid and he fell to the ground.  He made grunting sounds and moaning sounds according to his wife.  There was some shaking and he was more stiff than limp.  He passed out for about 2 minutes and by the time he came to there were police those around him.  He responded that he was okay but he did not remember this.  As soon as he got up he fell and passed out again and hit his head and he started to vomit.  They rolled him in his side and he was again rigid for 3 minutes and then it took about 3 more minutes before he became more awake and alert and he remembers talking to the ambulance people.  He did not go to the hospital.  He took a shower.  It took about an hour before he was back to normal according to his wife.  He was exhausted.  He was not very confused after the event but he had an amnestic event.  He did not bite his tongue.  He has never had any symptoms similar to this in the past.  He drank 2 glasses of wine that day.  He usually drinks beer.  He has never had withdrawal symptoms.  He did not have a history of getting lightheaded and dizzy associated with any activity.  He did not have any chest pain.  He has no history of syncope in the past.  He had an MRI of the brain which was unremarkable.    Objective   Vital Signs:   /80 (BP Location: Right arm, Patient Position: Sitting, Cuff Size: Adult)   Pulse 89   Ht 182.9 cm  "(72.01\")   Wt 89.8 kg (198 lb)   BMI 26.85 kg/m²     Physical Exam   He is alert, fluent, phasic, follows commands well.  Optic disc are normal bilaterally's full, EMs full directions gaze, facial strength is full, soft palate elevation and tongue are normal.  There is no weakness of the upper or lower extremities on individual muscle testing.  Reflex are normoactive and symmetrical biceps, triceps, patellar's and ankles not tested.  On station gait is able to tiptoe, heel walk, birgit and tandem without difficulty.  Heart is regular rhythm normal in rate.        Assessment and Plan  Diagnoses and all orders for this visit:    1. Convulsive syncope (Primary)  Assessment & Plan:  The differential diagnosis as this is a convulsive syncope and therefore I will refer him to cardiology for further work-up for syncope and cardiac work-up.  He is agreeable to this.    Orders:  -     EEG; Future  -     Ambulatory Referral to Cardiology    2. New onset seizure  Assessment & Plan:  I discussed with him and his wife that there is a high medical probability that this is a new onset seizure rather than a convulsive syncope.  I discussed with him regarding [place] driving laws.  I discussed with him and his wife regarding taking precautions to avoid heights, baths, power Clement, moving machinery.  I advised him to talk to his supervisor regarding his work if he is working around machinery.  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  I also discussed with him regarding starting him on antiepileptic medication to reduce his risk for recurrent seizure.  He states that he does not want to take it at this time.  I will do an EEG and if there is abnormality suggestive of seizure activity I will start him on antiepileptic medication.  I will reevaluate him again in 2 months time for follow-up.    Orders:  -     EEG; Future  -     Ambulatory Referral to Cardiology       Total time spent with the patient and coordinating " patient care was 35 minutes.    Follow Up  No follow-ups on file.  Patient was given instructions and counseling regarding his condition or for health maintenance advice. Please see specific information pulled into the AVS if appropriate.

## 2023-05-08 NOTE — TELEPHONE ENCOUNTER
Spoke with patient, he is aware medication was sent and to repeat fasting labs prior to his next appt on 5/23/23.

## 2023-05-08 NOTE — ASSESSMENT & PLAN NOTE
I discussed with him and his wife that there is a high medical probability that this is a new onset seizure rather than a convulsive syncope.  I discussed with him regarding [place] driving laws.  I discussed with him and his wife regarding taking precautions to avoid heights, baths, power Clement, moving machinery.  I advised him to talk to his supervisor regarding his work if he is working around machinery.  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  I also discussed with him regarding starting him on antiepileptic medication to reduce his risk for recurrent seizure.  He states that he does not want to take it at this time.  I will do an EEG and if there is abnormality suggestive of seizure activity I will start him on antiepileptic medication.  I will reevaluate him again in 2 months time for follow-up.

## 2023-05-08 NOTE — ASSESSMENT & PLAN NOTE
The differential diagnosis as this is a convulsive syncope and therefore I will refer him to cardiology for further work-up for syncope and cardiac work-up.  He is agreeable to this.

## 2023-05-18 ENCOUNTER — OFFICE VISIT (OUTPATIENT)
Dept: CARDIOLOGY | Facility: CLINIC | Age: 43
End: 2023-05-18
Payer: COMMERCIAL

## 2023-05-18 VITALS
BODY MASS INDEX: 26.68 KG/M2 | HEART RATE: 59 BPM | HEIGHT: 72 IN | DIASTOLIC BLOOD PRESSURE: 92 MMHG | SYSTOLIC BLOOD PRESSURE: 143 MMHG | WEIGHT: 197 LBS

## 2023-05-18 DIAGNOSIS — I10 PRIMARY HYPERTENSION: Chronic | ICD-10-CM

## 2023-05-18 DIAGNOSIS — E78.2 MIXED HYPERLIPIDEMIA: ICD-10-CM

## 2023-05-18 DIAGNOSIS — R40.20 LOC (LOSS OF CONSCIOUSNESS): Primary | ICD-10-CM

## 2023-05-18 PROCEDURE — 99204 OFFICE O/P NEW MOD 45 MIN: CPT | Performed by: INTERNAL MEDICINE

## 2023-05-18 PROCEDURE — 93000 ELECTROCARDIOGRAM COMPLETE: CPT | Performed by: INTERNAL MEDICINE

## 2023-05-18 NOTE — PROGRESS NOTES
Chief Complaint  Establish Care    Subjective        Reji West presents to [facility]  History of present illness:    Patient is a 42-year-old male with past medical history significant for hypertension and hyperlipidemia.  He states he was in his normal state of health and was in [facility] a couple weeks ago.  He had just gotten out of an [employer] and started to smoke a cigarette.  He was about group home through the cigarette and he started feeling his face get cold. He felt that possibly his vision was closing in and dizzy and then he passed out.  He apparently was out for about 10 seconds.  They tried to get him back to his feet but he passed out again.  At that time he vomited.  There was no tongue biting.  He was not confused and knew where he was.  He did apparently have stiffness in his body.  He has not had a further episode.  He did see a neurologist who did an MRI of the head that showed no abnormalities and set him up for an EEG.  He smokes 1-1/2 packs/day.  He drinks 5-6 beers every day.  Mother and father have no heart disease.        Past Medical History:   Diagnosis Date   • Anxiety    • Convulsive syncope 5/8/2023   • Depression    • Hyperlipidemia    • Hypertension          Past Surgical History:   Procedure Laterality Date   • HERNIA REPAIR     • TONSILLECTOMY            Social History     Socioeconomic History   • Marital status:    Tobacco Use   • Smoking status: Every Day     Packs/day: 1.50     Years: 10.00     Pack years: 15.00     Types: Cigarettes     Start date: 1/1/2007   • Smokeless tobacco: Former   Vaping Use   • Vaping Use: Never used   Substance and Sexual Activity   • Alcohol use: Yes     Alcohol/week: 20.0 standard drinks     Types: 20 Cans of beer per week   • Drug use: Never   • Sexual activity: Yes     Partners: Female     Birth control/protection: None         Family History   Problem Relation Age of Onset   • Stroke Maternal  "Grandmother    • Diabetes Maternal Grandfather    • Stroke Maternal Grandfather    • Other Paternal Grandmother         lupus          No Known Allergies         Current Outpatient Medications:   •  escitalopram (LEXAPRO) 10 MG tablet, TAKE 1 TABLET BY MOUTH DAILY, Disp: 90 tablet, Rfl: 3  •  lisinopril (PRINIVIL,ZESTRIL) 40 MG tablet, TAKE 1 TABLET BY MOUTH DAILY, Disp: 90 tablet, Rfl: 0  •  naproxen (Naprosyn) 500 MG tablet, Take 1 tablet by mouth 2 (Two) Times a Day With Meals., Disp: 60 tablet, Rfl: 0  •  rosuvastatin (Crestor) 20 MG tablet, Take 1 tablet by mouth Daily., Disp: 90 tablet, Rfl: 1      ROS:  Cardiac review of systems positive for loss of consciousness.    Objective     /92   Pulse 59   Ht 182.9 cm (72\")   Wt 89.4 kg (197 lb)   BMI 26.72 kg/m²       General Appearance:   · well developed  · well nourished  HENT:   · oropharynx moist  · lips not cyanotic  Respiratory:  · no respiratory distress  · normal breath sounds  · no rales  Cardiovascular:  · no jugular venous distention  · regular rhythm  · S1 normal, S2 normal  · no S3, no S4   · no murmur  · no rub, no thrill  · No carotid bruit  · pedal pulses normal  · lower extremity edema: none    Musculoskeletal:  · no clubbing of fingers.   · normocephalic, head atraumatic  Skin:   · warm, dry  Psychiatric:  · judgement and insight appropriate  · normal mood and affect    ECHO:    STRESS:    CATH:  No results found for this or any previous visit.    BMP:   Glucose   Date Value Ref Range Status   05/02/2023 83 65 - 99 mg/dL Final     BUN   Date Value Ref Range Status   05/02/2023 10 6 - 20 mg/dL Final     Creatinine   Date Value Ref Range Status   05/02/2023 0.77 0.76 - 1.27 mg/dL Final     Sodium   Date Value Ref Range Status   05/02/2023 136 136 - 145 mmol/L Final     Potassium   Date Value Ref Range Status   05/02/2023 4.4 3.5 - 5.2 mmol/L Final     Chloride   Date Value Ref Range Status   05/02/2023 97 (L) 98 - 107 mmol/L Final     CO2 "   Date Value Ref Range Status   05/02/2023 27.0 22.0 - 29.0 mmol/L Final     Calcium   Date Value Ref Range Status   05/02/2023 10.3 8.6 - 10.5 mg/dL Final     BUN/Creatinine Ratio   Date Value Ref Range Status   05/02/2023 13.0 7.0 - 25.0 Final     Anion Gap   Date Value Ref Range Status   05/02/2023 12.0 5.0 - 15.0 mmol/L Final     eGFR   Date Value Ref Range Status   05/02/2023 116.1 >60.0 mL/min/1.73 Final     LIPIDS:  Total Cholesterol   Date Value Ref Range Status   05/02/2023 359 (H) 0 - 200 mg/dL Final     Triglycerides   Date Value Ref Range Status   05/02/2023 332 (H) 0 - 150 mg/dL Final     HDL Cholesterol   Date Value Ref Range Status   05/02/2023 36 (L) 40 - 60 mg/dL Final     LDL Cholesterol    Date Value Ref Range Status   05/02/2023 250 (H) 0 - 100 mg/dL Final     VLDL Cholesterol   Date Value Ref Range Status   05/02/2023 73 (H) 5 - 40 mg/dL Final     LDL/HDL Ratio   Date Value Ref Range Status   05/02/2023 7.13  Final           ECG 12 Lead    Date/Time: 5/18/2023 3:54 PM  Performed by: Alexx Chaves MD  Authorized by: Alexx Chaves MD   Previous ECG: no previous ECG available  Rhythm: sinus rhythm                     ASSESSMENT:  Diagnoses and all orders for this visit:    1. LOC (loss of consciousness) (Primary)    2. Mixed hyperlipidemia    3. Primary hypertension         PLAN:    1.  These 2 loss of consciousness episodes sound most consistent with syncope.  He did have an MRI of the head and is scheduled for an EEG through neurology.  He did not really have any postictal state.  He does note when he was up in [place] that he was not drinking nearly as much water and was getting muscle cramps as he felt he could have been dehydrated.  2.  EKG shows normal sinus rhythm with no abnormalities.  3.  Patient is back to taking his Crestor as his cholesterol was very high.  He was just started back on it.  4.  Encourage cutting down on smoking and alcohol.  5.  I would just make sure  the patient gets back to being well-hydrated and watch him over the next couple months.  If he has further episodes he is to give us a call.  I do not think a tilt table is required at this time.  6.  Continue the lisinopril.      Return in about 3 months (around 8/18/2023).     Patient was given instructions and counseling regarding his condition or for health maintenance advice. Please see specific information pulled into the AVS if appropriate.         Alexx Chaves MD   5/18/2023  15:50 EDT

## 2023-06-09 ENCOUNTER — TELEPHONE (OUTPATIENT)
Dept: FAMILY MEDICINE CLINIC | Facility: CLINIC | Age: 43
End: 2023-06-09
Payer: COMMERCIAL

## 2023-06-12 RX ORDER — LISINOPRIL 40 MG/1
TABLET ORAL
Qty: 30 TABLET | Refills: 0 | Status: SHIPPED | OUTPATIENT
Start: 2023-06-12

## 2023-06-13 RX ORDER — LISINOPRIL 40 MG/1
TABLET ORAL
Qty: 30 TABLET | Refills: 0 | OUTPATIENT
Start: 2023-06-13

## 2023-06-20 PROBLEM — R56.9 NEW ONSET SEIZURE: Status: RESOLVED | Noted: 2023-05-02 | Resolved: 2023-06-20

## 2023-06-20 PROBLEM — Z00.00 ANNUAL PHYSICAL EXAM: Status: RESOLVED | Noted: 2022-03-18 | Resolved: 2023-06-20

## 2023-11-06 DIAGNOSIS — M25.572 ACUTE LEFT ANKLE PAIN: ICD-10-CM

## 2023-11-06 RX ORDER — NAPROXEN 500 MG/1
500 TABLET ORAL 2 TIMES DAILY WITH MEALS
Qty: 60 TABLET | Refills: 0 | Status: SHIPPED | OUTPATIENT
Start: 2023-11-06

## 2023-12-21 ENCOUNTER — OFFICE VISIT (OUTPATIENT)
Dept: FAMILY MEDICINE CLINIC | Facility: CLINIC | Age: 43
End: 2023-12-21
Payer: COMMERCIAL

## 2023-12-21 VITALS
OXYGEN SATURATION: 95 % | DIASTOLIC BLOOD PRESSURE: 86 MMHG | TEMPERATURE: 97.6 F | BODY MASS INDEX: 27.98 KG/M2 | HEIGHT: 72 IN | HEART RATE: 93 BPM | WEIGHT: 206.6 LBS | SYSTOLIC BLOOD PRESSURE: 138 MMHG | RESPIRATION RATE: 18 BRPM

## 2023-12-21 DIAGNOSIS — Z00.00 ANNUAL PHYSICAL EXAM: Primary | ICD-10-CM

## 2023-12-21 DIAGNOSIS — I10 PRIMARY HYPERTENSION: ICD-10-CM

## 2023-12-21 DIAGNOSIS — F41.1 GAD (GENERALIZED ANXIETY DISORDER): Chronic | ICD-10-CM

## 2023-12-21 DIAGNOSIS — E78.2 MIXED HYPERLIPIDEMIA: ICD-10-CM

## 2023-12-21 DIAGNOSIS — M25.572 ACUTE LEFT ANKLE PAIN: ICD-10-CM

## 2023-12-21 PROCEDURE — 99396 PREV VISIT EST AGE 40-64: CPT | Performed by: FAMILY MEDICINE

## 2023-12-21 RX ORDER — ROSUVASTATIN CALCIUM 20 MG/1
20 TABLET, COATED ORAL DAILY
Qty: 90 TABLET | Refills: 1 | Status: SHIPPED | OUTPATIENT
Start: 2023-12-21

## 2023-12-21 RX ORDER — NAPROXEN 500 MG/1
500 TABLET ORAL 2 TIMES DAILY WITH MEALS
Qty: 60 TABLET | Refills: 5 | Status: SHIPPED | OUTPATIENT
Start: 2023-12-21

## 2023-12-21 NOTE — PROGRESS NOTES
"Chief Complaint  Annual Exam    Subjective        Reji West presents to [facility]  History of Present Illness  He is here today for management of his chronic medical conditions. He is  and has one step daughter.  He has anxiety, depression, hyperlipidemia and hypertension and seizure like activity.  He has had hernia repair and tonsillectomy.  He has a family history of diabetes.     He lost his daughter due to a dental procedure at 20 years old.      He is complaining of left ankle pain and thinks he hurt it when he fell during this event.  He is also having right foot pain and thinks it is gout flare.     He has not had a seizure since her last visit.      The patient has no other complaints today and denies chest pain, shortness of breath, weakness, numbness, nausea, vomiting, diarrhea, dizziness or syncopal event.        Objective   Vital Signs:  /86 (BP Location: Left arm, Patient Position: Sitting, Cuff Size: Adult)   Pulse 93   Temp 97.6 °F (36.4 °C) (Tympanic)   Resp 18   Ht 182.9 cm (72.01\")   Wt 93.7 kg (206 lb 9.6 oz)   SpO2 95%   BMI 28.01 kg/m²   Estimated body mass index is 28.01 kg/m² as calculated from the following:    Height as of this encounter: 182.9 cm (72.01\").    Weight as of this encounter: 93.7 kg (206 lb 9.6 oz).             Physical Exam  Vitals reviewed.   Constitutional:       Appearance: He is well-developed and overweight.   HENT:      Head: Normocephalic and atraumatic.      Right Ear: External ear normal.      Left Ear: External ear normal.      Mouth/Throat:      Pharynx: No oropharyngeal exudate.   Eyes:      Conjunctiva/sclera: Conjunctivae normal.      Pupils: Pupils are equal, round, and reactive to light.   Neck:      Vascular: No carotid bruit.   Cardiovascular:      Rate and Rhythm: Normal rate and regular rhythm.      Heart sounds: No murmur heard.     No friction rub. No gallop.   Pulmonary:      Effort: " Pulmonary effort is normal.      Breath sounds: Normal breath sounds. No wheezing or rhonchi.   Abdominal:      General: There is no distension.   Skin:     General: Skin is warm and dry.   Neurological:      Mental Status: He is alert and oriented to person, place, and time.      Cranial Nerves: No cranial nerve deficit.      Motor: No weakness.   Psychiatric:         Mood and Affect: Mood and affect normal.         Behavior: Behavior normal.         Thought Content: Thought content normal.         Judgment: Judgment normal.        Result Review :    CMP          5/2/2023    13:06 6/20/2023    08:41   CMP   Glucose 83  93    BUN 10  10    Creatinine 0.77  0.76    EGFR 116.1  116.5    Sodium 136  140    Potassium 4.4  4.4    Chloride 97  103    Calcium 10.3  10.1    Total Protein 7.9  7.5    Albumin 5.0  5.1    Globulin 2.9  2.4    Total Bilirubin 0.4  0.3    Alkaline Phosphatase 92  68    AST (SGOT) 32  38    ALT (SGPT) 44  37    Albumin/Globulin Ratio 1.7  2.1    BUN/Creatinine Ratio 13.0  13.2    Anion Gap 12.0  13.6      CBC          5/2/2023    13:06   CBC   WBC 10.27    RBC 5.72    Hemoglobin 16.5    Hematocrit 47.7    MCV 83.4    MCH 28.8    MCHC 34.6    RDW 12.9    Platelets 287      Lipid Panel          5/2/2023    13:06 6/20/2023    08:41   Lipid Panel   Total Cholesterol 359  201    Triglycerides 332  294    HDL Cholesterol 36  47    VLDL Cholesterol 73  50    LDL Cholesterol  250  104    LDL/HDL Ratio 7.13  2.03      TSH          5/2/2023    13:06   TSH   TSH 2.700                   Assessment and Plan   Diagnoses and all orders for this visit:    1. Annual physical exam (Primary)  Assessment & Plan:  The patient was encouraged to always wear their seatbelt and never text and drive.  They were encouraged to get 7 to 8 hours sleep at night.  They were encouraged to exercise on a regular basis.  Screening labs were reviewed at today's visit and manage according to findings.        2. Mixed  hyperlipidemia  Assessment & Plan:  Lipid abnormalities are improving with treatment.  Nutritional counseling was provided. and Pharmacotherapy as ordered.  Lipids will be reassessed in 6 months.  -     rosuvastatin (Crestor) 20 MG tablet; Take 1 tablet by mouth Daily.  Dispense: 90 tablet; Refill: 1    3. Primary hypertension  Assessment & Plan:  Hypertension is improving with treatment.  Continue current treatment regimen.  Dietary sodium restriction.  Weight loss.  Blood pressure will be reassessed at the next regular appointment.      4. Acute left ankle pain  -     naproxen (NAPROSYN) 500 MG tablet; Take 1 tablet by mouth 2 (Two) Times a Day With Meals.  Dispense: 60 tablet; Refill: 5    5. MALI (generalized anxiety disorder)  Assessment & Plan:  Psychological condition is improving with treatment.  Continue current treatment regimen.  Psychological condition  will be reassessed at the next regular appointment.           Follow Up   No follow-ups on file.  Patient was given instructions and counseling regarding his condition or for health maintenance advice. Please see specific information pulled into the AVS if appropriate.         Answers submitted by the patient for this visit:  Primary Reason for Visit (Submitted on 12/20/2023)  What is the primary reason for your visit?: Other  Other (Submitted on 12/20/2023)  Please describe your symptoms.: 6 month check up  Have you had these symptoms before?: No  How long have you been having these symptoms?: 1-4 days

## 2024-03-08 RX ORDER — ESCITALOPRAM OXALATE 10 MG/1
10 TABLET ORAL DAILY
Qty: 30 TABLET | Refills: 9 | Status: SHIPPED | OUTPATIENT
Start: 2024-03-08

## 2024-06-28 RX ORDER — LISINOPRIL 40 MG/1
40 TABLET ORAL DAILY
Qty: 30 TABLET | Refills: 5 | Status: SHIPPED | OUTPATIENT
Start: 2024-06-28

## 2024-06-28 NOTE — TELEPHONE ENCOUNTER
Left vm letting pt know refill was sent in and if they need anything else feel free to give us a call

## 2024-07-10 RX ORDER — ROSUVASTATIN CALCIUM 20 MG/1
20 TABLET, COATED ORAL DAILY
Qty: 90 TABLET | Refills: 1 | Status: SHIPPED | OUTPATIENT
Start: 2024-07-10

## 2024-11-28 ENCOUNTER — HOSPITAL ENCOUNTER (EMERGENCY)
Facility: HOSPITAL | Age: 44
Discharge: HOME | End: 2024-11-29
Attending: EMERGENCY MEDICINE
Payer: COMMERCIAL

## 2024-11-28 DIAGNOSIS — T78.40XA ALLERGIC REACTION TO DRUG, INITIAL ENCOUNTER: Primary | ICD-10-CM

## 2024-11-28 PROCEDURE — 25010000002 METHYLPREDNISOLONE PER 125 MG: Performed by: EMERGENCY MEDICINE

## 2024-11-28 PROCEDURE — 99283 EMERGENCY DEPT VISIT LOW MDM: CPT

## 2024-11-28 PROCEDURE — 25010000002 DIPHENHYDRAMINE PER 50 MG: Performed by: EMERGENCY MEDICINE

## 2024-11-28 PROCEDURE — 96374 THER/PROPH/DIAG INJ IV PUSH: CPT

## 2024-11-28 PROCEDURE — 96375 TX/PRO/DX INJ NEW DRUG ADDON: CPT

## 2024-11-28 RX ORDER — DIPHENHYDRAMINE HYDROCHLORIDE 50 MG/ML
25 INJECTION INTRAMUSCULAR; INTRAVENOUS ONCE
Status: COMPLETED | OUTPATIENT
Start: 2024-11-28 | End: 2024-11-28

## 2024-11-28 RX ORDER — METHYLPREDNISOLONE SODIUM SUCCINATE 125 MG/2ML
125 INJECTION, POWDER, LYOPHILIZED, FOR SOLUTION INTRAMUSCULAR; INTRAVENOUS ONCE
Status: COMPLETED | OUTPATIENT
Start: 2024-11-28 | End: 2024-11-28

## 2024-11-28 RX ORDER — SODIUM CHLORIDE 0.9 % (FLUSH) 0.9 %
10 SYRINGE (ML) INJECTION AS NEEDED
Status: DISCONTINUED | OUTPATIENT
Start: 2024-11-28 | End: 2024-11-29 | Stop reason: HOSPADM

## 2024-11-28 RX ORDER — FAMOTIDINE 10 MG/ML
20 INJECTION, SOLUTION INTRAVENOUS ONCE
Status: COMPLETED | OUTPATIENT
Start: 2024-11-28 | End: 2024-11-28

## 2024-11-28 RX ORDER — DIPHENHYDRAMINE HCL 25 MG
25 CAPSULE ORAL EVERY 4 HOURS PRN
Qty: 30 CAPSULE | Refills: 0 | Status: SHIPPED | OUTPATIENT
Start: 2024-11-28 | End: 2024-12-03

## 2024-11-28 RX ORDER — PREDNISONE 50 MG/1
50 TABLET ORAL DAILY
Qty: 5 TABLET | Refills: 0 | Status: SHIPPED | OUTPATIENT
Start: 2024-11-28 | End: 2024-12-03

## 2024-11-28 RX ORDER — FAMOTIDINE 20 MG/1
20 TABLET, FILM COATED ORAL 2 TIMES DAILY
Qty: 10 TABLET | Refills: 0 | Status: SHIPPED | OUTPATIENT
Start: 2024-11-28 | End: 2024-12-03

## 2024-11-28 RX ADMIN — DIPHENHYDRAMINE HYDROCHLORIDE 25 MG: 50 INJECTION, SOLUTION INTRAMUSCULAR; INTRAVENOUS at 18:17

## 2024-11-28 RX ADMIN — METHYLPREDNISOLONE SODIUM SUCCINATE 125 MG: 125 INJECTION, POWDER, FOR SOLUTION INTRAMUSCULAR; INTRAVENOUS at 18:16

## 2024-11-28 RX ADMIN — FAMOTIDINE 20 MG: 10 INJECTION INTRAVENOUS at 18:17

## 2024-11-29 VITALS
HEART RATE: 67 BPM | TEMPERATURE: 97.8 F | WEIGHT: 200 LBS | DIASTOLIC BLOOD PRESSURE: 90 MMHG | SYSTOLIC BLOOD PRESSURE: 118 MMHG | BODY MASS INDEX: 27.09 KG/M2 | HEIGHT: 72 IN | OXYGEN SATURATION: 95 % | RESPIRATION RATE: 18 BRPM

## 2024-11-29 NOTE — DISCHARGE INSTRUCTIONS
Please do not take Augmentin or penicillin until discussed with the primary care physician    Please return to the emergency room immediately for facial swelling, throat swelling, difficulties speaking, difficulty swallowing, shortness of breath, vomiting, fever, near passing out, passing out or any new symptoms you are concerned

## 2024-11-29 NOTE — ED PROVIDER NOTES
Time: 11:40 PM EST  Date of encounter:  11/28/2024  Independent Historian/Clinical History and Information was obtained by:   Patient  Chief Complaint: Allergic reaction    History is limited by: N/A    History of Present Illness:  Patient is a 44 y.o. year old male who presents to the emergency department for evaluation of allergic reaction.  Patient notes that he woke up this morning with itching to the face, under the jaw, in his groin and axilla.  He notes that he had a rash there.  States that he went to work.  He states when he came home he took a nap.  When he woke up he continued to have the itching and rash.  He felt like that he had some worsening swelling of the lower face and possible difficulty swallowing so he came to the emergency room.  Patient did take Benadryl prior to arrival and feels that his itching is better.  He feels like his rash may be improving.  He denies any difficulty swallowing.  He denies any change in voice.  He is not short of breath.  He denies any recent fever rigors or myalgias.  Denies a headache.  He does note that he just finished a 7-day course of Augmentin for dental infection.  He denies any different food exposure.  He denies any new lotions detergents or soaps.    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 Authorizing Provider    escitalopram (LEXAPRO) 10 MG tablet TAKE ONE TABLET BY MOUTH ONCE DAILY 3/8/24   Sanam Johnson DO   escitalopram (LEXAPRO) 20 MG tablet Take 1 tablet by mouth Daily. 6/20/23   Sanam Johnson DO   lisinopril (PRINIVIL,ZESTRIL) 40 MG tablet TAKE 1 TABLET BY MOUTH DAILY. 6/28/24   Sanam Johnson DO   LORazepam (Ativan) 1 MG tablet Take 1 tablet by mouth Daily As Needed for Anxiety. 6/20/23   Sanam Johnson DO   naproxen (NAPROSYN) 500 MG tablet Take 1 tablet by mouth 2 (Two) Times a Day With Meals. 12/21/23   Sanam Johnson DO   rosuvastatin (CRESTOR) 20 MG tablet TAKE 1 TABLET BY MOUTH DAILY. 7/10/24   Sanam Johnson DO        Social History:   Social History     Tobacco Use    Smoking status: Every Day     Current packs/day: 1.50     Average packs/day: 1.5 packs/day for 17.9 years (26.9 ttl pk-yrs)     Types: Cigarettes     Start date: 1/1/2007     Passive exposure: Current    Smokeless tobacco: Former   Vaping Use    Vaping status: Never Used   Substance Use Topics    Alcohol use: Yes     Alcohol/week: 20.0 standard drinks of alcohol     Types: 20 Cans of beer per week    Drug use: Never         Review of Systems:  Review of Systems   Constitutional:  Negative for chills, diaphoresis and fever.   HENT:  Positive for facial swelling. Negative for congestion, postnasal drip, rhinorrhea and sore throat.    Eyes:  Negative for photophobia.   Respiratory:  Negative for cough, chest tightness and shortness of breath.    Cardiovascular:  Negative for chest pain, palpitations and leg swelling.   Gastrointestinal:  Negative for abdominal pain, diarrhea, nausea and vomiting.   Genitourinary:  Negative for difficulty urinating, dysuria, flank pain, frequency, hematuria and urgency.   Musculoskeletal:  Negative for neck pain and neck stiffness.   Skin:  Positive for rash. Negative for pallor.   Neurological:  Negative for dizziness, syncope, weakness, numbness and headaches.   Hematological:  Negative for adenopathy. Does  "not bruise/bleed easily.   Psychiatric/Behavioral: Negative.          Physical Exam:  /85 (BP Location: Left arm, Patient Position: Lying)   Pulse 63   Temp 97.5 °F (36.4 °C) (Oral)   Resp 15   Ht 182.9 cm (72\")   Wt 90.7 kg (200 lb)   SpO2 93%   BMI 27.12 kg/m²     Physical Exam  Vitals and nursing note reviewed.   Constitutional:       General: He is not in acute distress.     Appearance: Normal appearance. He is not ill-appearing, toxic-appearing or diaphoretic.   HENT:      Head: Normocephalic and atraumatic. No right periorbital erythema or left periorbital erythema.      Jaw: There is normal jaw occlusion. No trismus, tenderness, swelling or pain on movement.        Comments: Patient has some slight swelling of the lower lip.  No swelling of the tongue.  I did not notice any swelling of the neck.    The patient had no tenderness in the sublingual region.  There is no erythema of the oropharynx     Mouth/Throat:      Mouth: Mucous membranes are moist.      Dentition: No gingival swelling.      Tongue: No lesions. Tongue does not deviate from midline.      Palate: No mass and lesions.      Pharynx: No pharyngeal swelling, oropharyngeal exudate, posterior oropharyngeal erythema or uvula swelling.      Tonsils: No tonsillar exudate or tonsillar abscesses.   Eyes:      Pupils: Pupils are equal, round, and reactive to light.   Cardiovascular:      Rate and Rhythm: Normal rate and regular rhythm.      Pulses: Normal pulses.           Carotid pulses are 2+ on the right side and 2+ on the left side.       Radial pulses are 2+ on the right side and 2+ on the left side.        Femoral pulses are 2+ on the right side and 2+ on the left side.       Popliteal pulses are 2+ on the right side and 2+ on the left side.        Dorsalis pedis pulses are 2+ on the right side and 2+ on the left side.        Posterior tibial pulses are 2+ on the right side and 2+ on the left side.      Heart sounds: Normal heart sounds. " No murmur heard.  Pulmonary:      Effort: Pulmonary effort is normal. No tachypnea, accessory muscle usage, respiratory distress or retractions.      Breath sounds: Normal breath sounds. No stridor. No wheezing, rhonchi or rales.   Abdominal:      General: Abdomen is flat. There is no distension.      Palpations: Abdomen is soft. There is no mass or pulsatile mass.      Tenderness: There is no abdominal tenderness. There is no right CVA tenderness, left CVA tenderness, guarding or rebound.      Comments: No rigidity   Musculoskeletal:         General: No swelling, tenderness or deformity.      Cervical back: Neck supple. No edema or tenderness. No pain with movement, spinous process tenderness or muscular tenderness. Normal range of motion.      Right lower leg: No edema.      Left lower leg: No edema.   Lymphadenopathy:      Cervical:      Right cervical: No superficial cervical adenopathy.     Left cervical: No superficial cervical adenopathy.   Skin:     General: Skin is warm and dry.      Capillary Refill: Capillary refill takes less than 2 seconds.      Coloration: Skin is not jaundiced or pale.      Findings: Rash present. No erythema. Rash is urticarial. Rash is not crusting, macular, nodular, papular, purpuric, pustular, scaling or vesicular.      Comments: The patient appears to have a sunburn under the periorbital region bilaterally as well as the nose.  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  Patient states that he does have excessive exposure to the sun.  He states that bed is chronic and unchanged.    Patient does have what appears to be hives.  Very mild on the trunk extremities and in the perineum and back of his neck.  They do nicholas.  They are nontender.  There is no evidence of cellulitis.  There is no pustules.  There is no vesicles.  They are nontender.    He states this rash is actually significantly improved from when he took the Benadryl   Neurological:      General: No focal deficit  present.      Mental Status: He is alert and oriented to person, place, and time. Mental status is at baseline.      Cranial Nerves: Cranial nerves 2-12 are intact. No cranial nerve deficit.      Sensory: Sensation is intact. No sensory deficit.      Motor: Motor function is intact. No weakness or pronator drift.      Coordination: Coordination is intact. Coordination normal.   Psychiatric:         Mood and Affect: Mood normal.         Behavior: Behavior normal.                  Procedures:  Procedures      Medical Decision Making:      Comorbidities that affect care:    Hypertension, depression, hyperlipidemia, anxiety    External Notes reviewed:    None      The following orders were placed and all results were independently analyzed by me:  Orders Placed This Encounter   Procedures    Continuous Pulse Oximetry    Insert peripheral IV    Insert peripheral IV       Medications Given in the Emergency Department:  Medications   sodium chloride 0.9 % flush 10 mL (has no administration in time range)   sodium chloride 0.9 % flush 10 mL (has no administration in time range)   diphenhydrAMINE (BENADRYL) injection 25 mg (25 mg Intravenous Given 11/28/24 1817)   methylPREDNISolone sodium succinate (SOLU-Medrol) injection 125 mg (125 mg Intravenous Given 11/28/24 1816)   famotidine (PEPCID) injection 20 mg (20 mg Intravenous Given 11/28/24 1817)        ED Course:         Labs:    Lab Results (last 24 hours)       ** No results found for the last 24 hours. **             Imaging:    No Radiology Exams Resulted Within Past 24 Hours      Differential Diagnosis and Discussion:    Allergic Reaction: Differential diagnosis includes but is not limited to drug side effects, contact dermatitis, autoimmune conditions, infections, mast cell disorders, serum sickness, anaphylactoid reactions, angioedema, panic or anxiety attacks.        MDM  Number of Diagnoses or Management Options  Allergic reaction to drug, initial  encounter  Diagnosis management comments: The patient appeared to have hives diffusely.  The patient did have some slight swelling of the lower lip.  There were no other signs of angioedema.  The patient was having no respiratory difficulty.  The patient had no stridor.  Patient denied difficulty breathing or swallowing.    The patient was given IV Solu-Medrol Pepcid and Benadryl.  The patient's rash almost completely resolved after the Pepcid and Benadryl.  The patient was observed in the emergency room for 6 and half hours.  The patient was reassessed.  The patient states that his symptoms are completely gone or almost completely gone.  On examination there is no rash.  Patient had no stridor.  The patient no difficulty breathing.  The patient had no swelling of the face.  It appears the patient possibly had a delayed reaction to the Augmentin.  I will place the patient on prednisone, Pepcid and Benadryl.  I will have the patient follow-up with her doctor on Monday for reevaluation.  The patient will not take Augmentin or penicillin again until discussed with her primary care physician discussed the possibility of a delayed reaction to the Augmentin.    I have spoken with this patient.  I have explained the patient's condition, diagnosis and treatment plan based on the information available to me at this time.  I have answered the patient's questions and addressed any concerns.  The patient has a good understanding of the patient's diagnosis condition and treatment plan as can be expected at this point.  The vital signs have been stable.  The patient's condition is stable and appropriate for discharge from the emergency department.     Patient will pursue further outpatient evaluation with the primary care physician or other designated or consulting physicians as outlined in the discharge instruction.  The patient is agreeable to this plan of care and follow-up instructions have been explained in detail.  The  patient has received these instructions in written format and has expressed understanding of the discharge instructions.  The patient is aware that any significant change in condition or worsening of symptoms should prompt immediate return to this or the closest emergency department or call 911           Social Determinants of Health:    Patient is independent, reliable, and has access to care.       Disposition and Care Coordination:    Discharged: The patient is suitable and stable for discharge with no need for consideration of admission.    I have explained discharge medications and the need for follow up with the patient/caretakers. This was also printed in the discharge instructions. Patient was discharged with the following medications and follow up:      Medication List        New Prescriptions      diphenhydrAMINE 25 mg capsule  Commonly known as: BENADRYL  Take 1 capsule by mouth Every 4 (Four) Hours As Needed for Itching for up to 5 days.     famotidine 20 MG tablet  Commonly known as: PEPCID  Take 1 tablet by mouth 2 (Two) Times a Day for 5 days.     predniSONE 50 MG tablet  Commonly known as: DELTASONE  Take 1 tablet by mouth Daily for 5 days.               Where to Get Your Medications        These medications were sent to [facility] - [place], [place] - [address] - [phone] [facility] [phone]   [address], [facility] [address]      Phone: [phone]   diphenhydrAMINE 25 mg capsule  famotidine 20 MG tablet  predniSONE 50 MG tablet      Sanam Johnson DO  [address]    [place] [id number] [phone]    Schedule an appointment as soon as possible for a visit on 12/2/2024         Final diagnoses:   Allergic reaction to drug, initial encounter        ED Disposition       ED Disposition   Discharge    Condition   Stable    Comment   --               This medical record created using voice recognition software.             Alexx Tabares DO  11/28/24  [id number]

## 2025-01-06 RX ORDER — ESCITALOPRAM OXALATE 10 MG/1
10 TABLET ORAL DAILY
Qty: 30 TABLET | Refills: 2 | Status: SHIPPED | OUTPATIENT
Start: 2025-01-06

## 2025-01-09 RX ORDER — LISINOPRIL 40 MG/1
40 TABLET ORAL DAILY
Qty: 30 TABLET | Refills: 5 | OUTPATIENT
Start: 2025-01-09

## 2025-01-20 RX ORDER — LISINOPRIL 40 MG/1
40 TABLET ORAL DAILY
Qty: 30 TABLET | Refills: 5 | OUTPATIENT
Start: 2025-01-20

## 2025-01-20 RX ORDER — ROSUVASTATIN CALCIUM 20 MG/1
20 TABLET, COATED ORAL DAILY
Qty: 30 TABLET | Refills: 1 | OUTPATIENT
Start: 2025-01-20

## 2025-01-21 ENCOUNTER — TELEPHONE (OUTPATIENT)
Dept: FAMILY MEDICINE CLINIC | Facility: CLINIC | Age: 45
End: 2025-01-21
Payer: COMMERCIAL

## 2025-01-21 RX ORDER — LISINOPRIL 40 MG/1
40 TABLET ORAL DAILY
Qty: 30 TABLET | Refills: 0 | Status: SHIPPED | OUTPATIENT
Start: 2025-01-21

## 2025-01-21 RX ORDER — ROSUVASTATIN CALCIUM 20 MG/1
20 TABLET, COATED ORAL DAILY
Qty: 30 TABLET | Refills: 0 | Status: SHIPPED | OUTPATIENT
Start: 2025-01-21

## 2025-01-21 NOTE — TELEPHONE ENCOUNTER
Caller: Reji West    Relationship: Self    Best call back number: [phone]    Requested Prescriptions:   Requested Prescriptions     Pending Prescriptions Disp Refills    rosuvastatin (CRESTOR) 20 MG tablet 90 tablet 1     Sig: Take 1 tablet by mouth Daily.    lisinopril (PRINIVIL,ZESTRIL) 40 MG tablet 30 tablet 5     Sig: Take 1 tablet by mouth Daily.        Pharmacy where request should be sent:    [facility] [place] [address] [address] [facility]. - [phone]  - [phone]  [phone]       Last office visit with prescribing clinician: 12/21/2023   Last telemedicine visit with prescribing clinician: Visit date not found   Next office visit with prescribing clinician: 2/4/2025     Additional details provided by patient: OUT OF MEDICATION AND HAS AN UPCOMING APPOINTMENT    Does the patient have less than a 3 day supply:  [x] Yes  [] No    Would you like a call back once the refill request has been completed: [] Yes [] No    If the office needs to give you a call back, can they leave a voicemail: [] Yes [] No    Asya Rose Rep   01/21/25 12:04 EST       
Refilled 30 days patient has upcoming appt 2/4/25  
Home

## 2025-02-04 ENCOUNTER — OFFICE VISIT (OUTPATIENT)
Dept: FAMILY MEDICINE CLINIC | Facility: CLINIC | Age: 45
End: 2025-02-04
Payer: COMMERCIAL

## 2025-02-04 VITALS
SYSTOLIC BLOOD PRESSURE: 116 MMHG | BODY MASS INDEX: 26.87 KG/M2 | DIASTOLIC BLOOD PRESSURE: 81 MMHG | OXYGEN SATURATION: 99 % | WEIGHT: 198.4 LBS | TEMPERATURE: 97.8 F | HEIGHT: 72 IN | HEART RATE: 64 BPM | RESPIRATION RATE: 17 BRPM

## 2025-02-04 DIAGNOSIS — Z11.59 NEED FOR HEPATITIS C SCREENING TEST: ICD-10-CM

## 2025-02-04 DIAGNOSIS — Z00.00 ANNUAL PHYSICAL EXAM: Primary | ICD-10-CM

## 2025-02-04 DIAGNOSIS — E78.2 MIXED HYPERLIPIDEMIA: Chronic | ICD-10-CM

## 2025-02-04 DIAGNOSIS — F41.1 GAD (GENERALIZED ANXIETY DISORDER): Chronic | ICD-10-CM

## 2025-02-04 DIAGNOSIS — R53.83 OTHER FATIGUE: ICD-10-CM

## 2025-02-04 DIAGNOSIS — I10 PRIMARY HYPERTENSION: Chronic | ICD-10-CM

## 2025-02-04 PROBLEM — R55 CONVULSIVE SYNCOPE: Chronic | Status: ACTIVE | Noted: 2023-05-08

## 2025-02-04 PROCEDURE — 99396 PREV VISIT EST AGE 40-64: CPT | Performed by: FAMILY MEDICINE

## 2025-02-04 PROCEDURE — 99213 OFFICE O/P EST LOW 20 MIN: CPT | Performed by: FAMILY MEDICINE

## 2025-02-04 RX ORDER — ROSUVASTATIN CALCIUM 20 MG/1
20 TABLET, COATED ORAL DAILY
Qty: 30 TABLET | Refills: 0 | Status: SHIPPED | OUTPATIENT
Start: 2025-02-04 | End: 2025-02-04

## 2025-02-04 RX ORDER — ROSUVASTATIN CALCIUM 20 MG/1
20 TABLET, COATED ORAL DAILY
Qty: 90 TABLET | Refills: 3 | Status: SHIPPED | OUTPATIENT
Start: 2025-02-04

## 2025-02-04 RX ORDER — LISINOPRIL 40 MG/1
40 TABLET ORAL DAILY
Qty: 30 TABLET | Refills: 0 | Status: SHIPPED | OUTPATIENT
Start: 2025-02-04 | End: 2025-02-04

## 2025-02-04 RX ORDER — LISINOPRIL 40 MG/1
40 TABLET ORAL DAILY
Qty: 90 TABLET | Refills: 3 | Status: SHIPPED | OUTPATIENT
Start: 2025-02-04

## 2025-02-04 NOTE — PROGRESS NOTES
"Chief Complaint  Annual Exam and Med Refill    Subjective        Reji West presents to [facility]  History of Present Illness  Pt is a 44 year old male that presents to the clinic today to follow up on his chronic medical conditions and medication refills.  He is  with one step child.  He lost his only child to a dental procedure when she was 20 years old.  He has been taking lexapro 20mg daily and reports that this is helping his mental health.        Objective   Vital Signs:  /81 (BP Location: Left arm, Patient Position: Sitting, Cuff Size: Adult)   Pulse 64   Temp 97.8 °F (36.6 °C) (Temporal)   Resp 17   Ht 182.9 cm (72.01\")   Wt 90 kg (198 lb 6.4 oz)   SpO2 99%   BMI 26.90 kg/m²   Estimated body mass index is 26.9 kg/m² as calculated from the following:    Height as of this encounter: 182.9 cm (72.01\").    Weight as of this encounter: 90 kg (198 lb 6.4 oz).            Physical Exam  Vitals reviewed.   Constitutional:       Appearance: Normal appearance. He is normal weight.   HENT:      Head: Normocephalic.      Right Ear: External ear normal.      Left Ear: External ear normal.      Nose: Nose normal.      Mouth/Throat:      Mouth: Mucous membranes are moist.   Eyes:      Conjunctiva/sclera: Conjunctivae normal.   Cardiovascular:      Rate and Rhythm: Normal rate and regular rhythm.      Pulses: Normal pulses.   Pulmonary:      Effort: Pulmonary effort is normal.      Breath sounds: Normal breath sounds.   Musculoskeletal:         General: Normal range of motion.      Cervical back: Normal range of motion.   Skin:     General: Skin is warm and dry.      Findings: Erythema present.      Comments: To face     Neurological:      General: No focal deficit present.      Mental Status: He is alert and oriented to person, place, and time.   Psychiatric:         Mood and Affect: Mood normal.         Behavior: Behavior normal.        Result Review " :                Assessment and Plan   Diagnoses and all orders for this visit:    1. Primary hypertension (Primary)    2. Mixed hyperlipidemia    3. MALI (generalized anxiety disorder)  Comments:  Controlled on current medication    4. Convulsive syncope             Follow Up   No follow-ups on file.  Patient was given instructions and counseling regarding his condition or for health maintenance advice. Please see specific information pulled into the AVS if appropriate.

## 2025-02-04 NOTE — PROGRESS NOTES
"Chief Complaint  Annual Exam and Med Refill    Subjective        Reji West presents to [facility]  History of Present Illness  He is here today for management of his chronic medical conditions. He is .  He has anxiety, depression, hyperlipidemia and hypertension and seizure like activity.  He has had hernia repair and tonsillectomy.  He has a family history of diabetes.    He is taking total T for his testosterone replacement and tumoric.      He lost his daughter due to a dental procedure at 20 years old.      He is complaining of left ankle pain and thinks he hurt it when he fell during this event.  He is also having right foot pain and thinks it is gout flare.     He has not had a seizure since her last visit.      The patient has no other complaints today and denies chest pain, shortness of breath, weakness, numbness, nausea, vomiting, diarrhea, dizziness or syncopal event.        Objective   Vital Signs:  /81 (BP Location: Left arm, Patient Position: Sitting, Cuff Size: Adult)   Pulse 64   Temp 97.8 °F (36.6 °C) (Temporal)   Resp 17   Ht 182.9 cm (72.01\")   Wt 90 kg (198 lb 6.4 oz)   SpO2 99%   BMI 26.90 kg/m²   Estimated body mass index is 26.9 kg/m² as calculated from the following:    Height as of this encounter: 182.9 cm (72.01\").    Weight as of this encounter: 90 kg (198 lb 6.4 oz).            Physical Exam  Vitals reviewed.   Constitutional:       Appearance: He is well-developed and overweight.   HENT:      Head: Normocephalic and atraumatic.      Right Ear: External ear normal.      Left Ear: External ear normal.      Mouth/Throat:      Pharynx: No oropharyngeal exudate.   Eyes:      Conjunctiva/sclera: Conjunctivae normal.      Pupils: Pupils are equal, round, and reactive to light.   Neck:      Vascular: No carotid bruit.   Cardiovascular:      Rate and Rhythm: Normal rate and regular rhythm.      Heart sounds: No murmur heard.     No " friction rub. No gallop.   Pulmonary:      Effort: Pulmonary effort is normal.      Breath sounds: Normal breath sounds. No wheezing or rhonchi.   Abdominal:      General: There is no distension.   Skin:     General: Skin is warm and dry.   Neurological:      Mental Status: He is alert and oriented to person, place, and time.      Cranial Nerves: No cranial nerve deficit.      Motor: No weakness.   Psychiatric:         Mood and Affect: Mood and affect normal.         Behavior: Behavior normal.         Thought Content: Thought content normal.         Judgment: Judgment normal.        Result Review :                      Assessment and Plan   Diagnoses and all orders for this visit:    1. Annual physical exam (Primary)  Assessment & Plan:  The patient was encouraged to always wear their seatbelt and never text and drive.  They were encouraged to get 7 to 8 hours sleep at night.  They were encouraged to exercise on a regular basis.  Screening labs were reviewed at today's visit and manage according to findings.      Orders:  -     TSH+Free T4; Future  -     Comprehensive Metabolic Panel; Future  -     CBC & Differential; Future  -     Urinalysis With Microscopic - Urine, Clean Catch; Future    2. Primary hypertension  Assessment & Plan:  Hypertension is improving with treatment.  Continue current treatment regimen.  Dietary sodium restriction.  Weight loss.  Blood pressure will be reassessed at the next regular appointment.    Orders:  -     Discontinue: lisinopril (PRINIVIL,ZESTRIL) 40 MG tablet; Take 1 tablet by mouth Daily.  Dispense: 30 tablet; Refill: 0  -     lisinopril (PRINIVIL,ZESTRIL) 40 MG tablet; Take 1 tablet by mouth Daily.  Dispense: 90 tablet; Refill: 3    3. Mixed hyperlipidemia  Assessment & Plan:  Lipid abnormalities are improving with treatment.  Nutritional counseling was provided. and Pharmacotherapy as ordered.  Lipids will be reassessed in 6 months.    Orders:  -     Discontinue: rosuvastatin  (CRESTOR) 20 MG tablet; Take 1 tablet by mouth Daily.  Dispense: 30 tablet; Refill: 0  -     Lipid Panel; Future  -     rosuvastatin (CRESTOR) 20 MG tablet; Take 1 tablet by mouth Daily.  Dispense: 90 tablet; Refill: 3    4. MALI (generalized anxiety disorder)  Comments:  Controlled on current medication  Assessment & Plan:  Psychological condition is improving with treatment.  Continue current treatment regimen.  Psychological condition  will be reassessed at the next regular appointment.      5. Other fatigue  Comments:  The patient has been feeling more fatigued since his last visit.  He is concerned he may have low testosterone and would like it checked today.  Orders:  -     Testosterone (Free & Total), LC / MS; Future    6. Need for hepatitis C screening test  -     Hepatitis C antibody; Future             Follow Up   Return in about 1 year (around 2/4/2026).  Patient was given instructions and counseling regarding his condition or for health maintenance advice. Please see specific information pulled into the AVS if appropriate.

## 2025-04-04 RX ORDER — ESCITALOPRAM OXALATE 10 MG/1
10 TABLET ORAL DAILY
Qty: 90 TABLET | Refills: 1 | Status: SHIPPED | OUTPATIENT
Start: 2025-04-04